# Patient Record
Sex: FEMALE | Race: WHITE | NOT HISPANIC OR LATINO | Employment: UNEMPLOYED | URBAN - NONMETROPOLITAN AREA
[De-identification: names, ages, dates, MRNs, and addresses within clinical notes are randomized per-mention and may not be internally consistent; named-entity substitution may affect disease eponyms.]

---

## 2017-07-25 ENCOUNTER — OFFICE VISIT (OUTPATIENT)
Dept: OBSTETRICS AND GYNECOLOGY | Facility: CLINIC | Age: 32
End: 2017-07-25

## 2017-07-25 VITALS
BODY MASS INDEX: 37.56 KG/M2 | HEIGHT: 64 IN | SYSTOLIC BLOOD PRESSURE: 126 MMHG | DIASTOLIC BLOOD PRESSURE: 74 MMHG | WEIGHT: 220 LBS

## 2017-07-25 DIAGNOSIS — N63.10 LUMP OF RIGHT BREAST: Primary | ICD-10-CM

## 2017-07-25 PROCEDURE — 99214 OFFICE O/P EST MOD 30 MIN: CPT | Performed by: PHYSICIAN ASSISTANT

## 2017-07-25 NOTE — PROGRESS NOTES
"Subjective   Chief Complaint   Patient presents with   • Breast Mass     pt recently found lump in right breast     /74  Ht 64\" (162.6 cm)  Wt 220 lb (99.8 kg)  LMP 2017  BMI 37.76 kg/m2   Kya Dempsey is a 32 y.o. year old  presenting to be seen for evaluation of right breast lump  Patient reports she noted possible right breast lump this past Friday while doing self breast exam  Patient has not had any breast pain-no nipple discharge or other symptoms  She has no family history of breast cancer  She is on no hormonal  contraception    Past Medical History:   Diagnosis Date   • Abnormal Pap smear of cervix    • Anxiety    • Depression    • GERD (gastroesophageal reflux disease)    • History of Papanicolaou smear of cervix    • IBS (irritable bowel syndrome)     pt states that its managed      No current outpatient prescriptions on file.   No Known Allergies   Past Surgical History:   Procedure Laterality Date   • LAPAROSCOPIC GASTRIC BANDING      placement , removal    • TUBAL ABDOMINAL LIGATION     • WISDOM TOOTH EXTRACTION        Social History     Social History   • Marital status: Single     Spouse name: N/A   • Number of children: N/A   • Years of education: N/A     Occupational History   • Not on file.     Social History Main Topics   • Smoking status: Never Smoker   • Smokeless tobacco: Never Used   • Alcohol use No   • Drug use: Yes     Special: Marijuana      Comment: occasionally   • Sexual activity: Defer     Other Topics Concern   • Not on file     Social History Narrative   • No narrative on file      Family History   Problem Relation Age of Onset   • Diabetes Father    • Hyperlipidemia Father    • Hypertension Father    • Other Father    • Diabetes Mother    • Hyperlipidemia Mother    • Hypertension Mother    • Skin cancer Paternal Grandfather    • Diabetes Paternal Grandfather    • Osteoporosis Paternal Grandmother    • Stroke Paternal Grandmother    • " Diabetes Maternal Grandmother    • Hyperlipidemia Maternal Grandmother    • Hypertension Maternal Grandmother        The following portions of the patient's history were reviewed and updated as appropriate:problem list, current medications, allergies, past family history, past medical history, past social history and past surgical history.    Review of Systems   Constitutional: Negative.    Gastrointestinal: Negative.    Genitourinary: Negative.    Hematological: Negative.         Objective     Physical Exam   Constitutional: She appears well-developed and well-nourished. She is cooperative.   Pulmonary/Chest: Right breast exhibits no inverted nipple, no mass, no nipple discharge, no skin change and no tenderness. Left breast exhibits no inverted nipple, no mass, no nipple discharge, no skin change and no tenderness.   Right breast with palpable flat oval 1 cm ropey  nodule at 3 oclock--this feels more consistent with fibrocystic tissue   Neurological: She is alert.   Skin: Skin is warm and dry.   Psychiatric: She has a normal mood and affect. Her behavior is normal.     Kya was seen today for breast mass.    Diagnoses and all orders for this visit:    Lump of right breast  -     US Breast Right Complete; Future             This note was electronically signed.    Mildred Barton PA-C   July 25, 2017

## 2017-07-25 NOTE — PATIENT INSTRUCTIONS
Patient reassurred that clinically very low level of suspicion regarding right breast nodule  Will obtain right breast ultrasound though for further evaluation

## 2017-07-27 ENCOUNTER — HOSPITAL ENCOUNTER (OUTPATIENT)
Dept: ULTRASOUND IMAGING | Facility: HOSPITAL | Age: 32
Discharge: HOME | End: 2017-07-27
Admitting: PHYSICIAN ASSISTANT

## 2017-07-27 DIAGNOSIS — N63.10 LUMP OF RIGHT BREAST: ICD-10-CM

## 2017-07-27 PROCEDURE — 76641 ULTRASOUND BREAST COMPLETE: CPT

## 2017-08-30 ENCOUNTER — OFFICE VISIT (OUTPATIENT)
Dept: OBSTETRICS AND GYNECOLOGY | Facility: CLINIC | Age: 32
End: 2017-08-30

## 2017-08-30 VITALS
WEIGHT: 223 LBS | DIASTOLIC BLOOD PRESSURE: 66 MMHG | HEIGHT: 64 IN | SYSTOLIC BLOOD PRESSURE: 124 MMHG | BODY MASS INDEX: 38.07 KG/M2

## 2017-08-30 DIAGNOSIS — Z01.419 ENCOUNTER FOR GYNECOLOGICAL EXAMINATION WITHOUT ABNORMAL FINDING: Primary | ICD-10-CM

## 2017-08-30 PROCEDURE — 99385 PREV VISIT NEW AGE 18-39: CPT | Performed by: NURSE PRACTITIONER

## 2017-08-30 RX ORDER — MULTIPLE VITAMINS W/ MINERALS TAB 9MG-400MCG
1 TAB ORAL DAILY
COMMUNITY
End: 2018-09-21

## 2017-08-30 RX ORDER — CHLORAL HYDRATE 500 MG
CAPSULE ORAL
COMMUNITY
End: 2019-09-11

## 2017-09-15 DIAGNOSIS — Z01.419 ENCOUNTER FOR GYNECOLOGICAL EXAMINATION WITHOUT ABNORMAL FINDING: ICD-10-CM

## 2018-01-09 ENCOUNTER — OFFICE VISIT (OUTPATIENT)
Dept: OBSTETRICS AND GYNECOLOGY | Facility: CLINIC | Age: 33
End: 2018-01-09

## 2018-01-09 VITALS
DIASTOLIC BLOOD PRESSURE: 72 MMHG | BODY MASS INDEX: 38.93 KG/M2 | HEIGHT: 64 IN | SYSTOLIC BLOOD PRESSURE: 116 MMHG | WEIGHT: 228 LBS

## 2018-01-09 DIAGNOSIS — R87.610 PAP SMEAR ABNORMALITY OF CERVIX WITH ASCUS FAVORING DYSPLASIA: Primary | ICD-10-CM

## 2018-01-09 PROCEDURE — 57455 BIOPSY OF CERVIX W/SCOPE: CPT | Performed by: OBSTETRICS & GYNECOLOGY

## 2018-01-09 PROCEDURE — 81025 URINE PREGNANCY TEST: CPT | Performed by: OBSTETRICS & GYNECOLOGY

## 2018-01-09 RX ORDER — IBUPROFEN 800 MG/1
TABLET ORAL
COMMUNITY
Start: 2018-01-04 | End: 2018-09-21

## 2018-01-09 NOTE — PATIENT INSTRUCTIONS
Colposcopy  Colposcopy is a procedure to examine your cervix and vagina, or the area around the outside of your vagina, for abnormalities or signs of disease. The procedure is done using a lighted microscope called a colposcope. Tissue samples may be collected during the colposcopy if your health care provider finds any unusual cells. A colposcopy may be done if a woman has:  · An abnormal Pap test. A Pap test is a medical test done to evaluate cells that are on the surface of the cervix.  · A Pap test result that is suggestive of human papillomavirus (HPV). This virus can cause genital warts and is linked to the development of cervical cancer.  · A sore on her cervix and the results of a Pap test were normal.  · Genital warts on the cervix or in or around the outside of the vagina.  · A mother who took the drug diethylstilbestrol (PILO) while pregnant.  · Painful intercourse.  · Vaginal bleeding, especially after sexual intercourse.  LET YOUR HEALTH CARE PROVIDER KNOW ABOUT:  · Any allergies you have.  · All medicines you are taking, including vitamins, herbs, eye drops, creams, and over-the-counter medicines.  · Previous problems you or members of your family have had with the use of anesthetics.  · Any blood disorders you have.  · Previous surgeries you have had.  · Medical conditions you have.  RISKS AND COMPLICATIONS  Generally, a colposcopy is a safe procedure. However, as with any procedure, complications can occur. Possible complications include:  · Bleeding.  · Infection.  · Missed lesions.  BEFORE THE PROCEDURE   · Tell your health care provider if you have your menstrual period. A colposcopy typically is not done during menstruation.  · For 24 hours before the colposcopy, do not:    Douche.    Use tampons.    Use medicines, creams, or suppositories in the vagina.    Have sexual intercourse.  PROCEDURE   During the procedure, you will be lying on your back with your feet in foot rests (stirrups). A warm  metal or plastic instrument (speculum) will be placed in your vagina to keep it open and to allow the health care provider to see the cervix. The colposcope will be placed outside the vagina. It will be used to magnify and examine the cervix, vagina, and the area around the outside of the vagina. A small amount of liquid solution will be placed on the area that is to be viewed. This solution will make it easier to see the abnormal cells. Your health care provider will use tools to suck out mucus and cells from the canal of the cervix. Then he or she will record the location of the abnormal areas.  If a biopsy is done during the procedure, a medicine will usually be given to numb the area (local anesthetic). You may feel mild pain or cramping while the biopsy is done. After the procedure, tissue samples collected during the biopsy will be sent to a lab for analysis.  AFTER THE PROCEDURE   You will be given instructions on when to follow up with your health care provider for your test results. It is important to keep your appointment.     This information is not intended to replace advice given to you by your health care provider. Make sure you discuss any questions you have with your health care provider.     Document Released: 03/09/2004 Document Revised: 08/20/2014 Document Reviewed: 07/17/2014  [employer] Interactive Patient Education ©2017 [employer]

## 2018-01-09 NOTE — PROGRESS NOTES
Colposcopy    Date of procedure:  1/9/2018    Risks and benefits discussed? yes  All questions answered? yes  Consents given by the patient  Written consent obtained? yes    Pre-op indication: ASCUS with POSITIVE high risk HPV  Procedure documentation:    The cervix was initially viewed colposcopically.  The cervix was next bathed in acetic acid.   The findings were as follows:      The transformation zone was able to be seen adequately.    Acetowhite noted at 1 o'clock and 12 o'clock    Ectocervical biopsies taken from 1 o'clock and 12 o'clock.  Monsels solution was applied to the biopsy sites..    An ECC was not performed.      Colposcopic Impression: 1. Adequate colposcopy  2. Colposcopic findings are consistent with PAP       Plan: Will base further treatment on pathology results      This note was electronically signed.    Parminder Edmonds MD.

## 2018-01-10 LAB
B-HCG UR QL: NEGATIVE
INTERNAL NEGATIVE CONTROL: NEGATIVE
INTERNAL POSITIVE CONTROL: POSITIVE
Lab: NORMAL

## 2018-01-12 DIAGNOSIS — R87.610 PAP SMEAR ABNORMALITY OF CERVIX WITH ASCUS FAVORING DYSPLASIA: ICD-10-CM

## 2018-09-21 ENCOUNTER — OFFICE VISIT (OUTPATIENT)
Dept: OBSTETRICS AND GYNECOLOGY | Facility: CLINIC | Age: 33
End: 2018-09-21

## 2018-09-21 VITALS — DIASTOLIC BLOOD PRESSURE: 70 MMHG | HEIGHT: 64 IN | SYSTOLIC BLOOD PRESSURE: 126 MMHG

## 2018-09-21 DIAGNOSIS — Z01.419 ENCOUNTER FOR GYNECOLOGICAL EXAMINATION WITHOUT ABNORMAL FINDING: Primary | ICD-10-CM

## 2018-09-21 PROCEDURE — 99395 PREV VISIT EST AGE 18-39: CPT | Performed by: NURSE PRACTITIONER

## 2018-09-21 RX ORDER — SODIUM PHOSPHATE,MONO-DIBASIC 19G-7G/118
ENEMA (ML) RECTAL
COMMUNITY
End: 2019-09-11

## 2018-09-27 DIAGNOSIS — Z01.419 ENCOUNTER FOR GYNECOLOGICAL EXAMINATION WITHOUT ABNORMAL FINDING: ICD-10-CM

## 2019-02-05 ENCOUNTER — OFFICE VISIT (OUTPATIENT)
Dept: OBSTETRICS AND GYNECOLOGY | Facility: CLINIC | Age: 34
End: 2019-02-05

## 2019-02-05 DIAGNOSIS — N93.0 PCB (POST COITAL BLEEDING): Primary | ICD-10-CM

## 2019-02-05 PROCEDURE — 99213 OFFICE O/P EST LOW 20 MIN: CPT | Performed by: MIDWIFE

## 2019-02-06 VITALS — HEIGHT: 64 IN | DIASTOLIC BLOOD PRESSURE: 80 MMHG | BODY MASS INDEX: 39.14 KG/M2 | SYSTOLIC BLOOD PRESSURE: 122 MMHG

## 2019-02-06 NOTE — PROGRESS NOTES
Subjective   Chief Complaint   Patient presents with   • Follow-up     TVS for AUB, declined weight      Kya Dempsey is a 33 y.o. year old  presenting to be seen for postcoital bleeding.  She was here 2018 for an annual exam and had a friable cervix at that visit.  Her pap smear showed inflammation then.  She has continued to have bleeding after every episode of sex. The amount varies from spotting to small amount of bright red blood.  She denies any pain. Her menstrual cycles are regular. She has had a tubal ligation.    History  Past Medical History:   Diagnosis Date   • Abnormal Pap smear of cervix    • Anxiety    • Depression    • GERD (gastroesophageal reflux disease)    • History of Papanicolaou smear of cervix    • IBS (irritable bowel syndrome)     pt states that its managed   • Papanicolaou smear of cervix within last year 2017    ASCUS HPV+   , Past Surgical History:   Procedure Laterality Date   • LAPAROSCOPIC GASTRIC BANDING      placement , removal    • TUBAL ABDOMINAL LIGATION     • WISDOM TOOTH EXTRACTION     , Family History   Problem Relation Age of Onset   • Diabetes Father    • Hyperlipidemia Father    • Hypertension Father    • Other Father    • Diabetes Mother    • Hyperlipidemia Mother    • Hypertension Mother    • Skin cancer Paternal Grandfather    • Diabetes Paternal Grandfather    • Osteoporosis Paternal Grandmother    • Stroke Paternal Grandmother    • Diabetes Maternal Grandmother    • Hyperlipidemia Maternal Grandmother    • Hypertension Maternal Grandmother    , Social History     Tobacco Use   • Smoking status: Never Smoker   • Smokeless tobacco: Never Used   Substance Use Topics   • Alcohol use: No   • Drug use: Yes     Types: Marijuana     Comment: occasionally   ,   (Not in a hospital admission) and Allergies:  Patient has no known allergies.    Social History    Tobacco Use      Smoking status: Never Smoker      Smokeless tobacco: Never  "Used      Review of Systems  Pertinent items are noted in HPI, all other systems reviewed and negative       Objective   /80   Ht 162.6 cm (64\")   LMP 01/27/2019 (Exact Date)   BMI 39.14 kg/m²     Physical Exam:  General Appearance: alert and cooperative  Abdomen: no masses and soft non-tender  Pelvic: External genitalia:  normal appearance of the external genitalia including Bartholin's and [place]'s glands.  Vaginal:  normal pink mucosa without prolapse or lesions.  Neurologic: Mental Status orientated to person, place, time and situation, Speech normal content and execusion  Psych: normal mood and affect, thought content organized and appropriate judgment    Lab Review   No data reviewed    Imaging   Pelvic ultrasound report   Uterus 7.5 x 4.18 x 3.32, anteverted  Endo thickness 7.65  Adnexa normal          Assessment /Plan    Kya was seen today for follow-up.    Diagnoses and all orders for this visit:    PCB (post coital bleeding)  -     Cancel: US Non-ob Transvaginal  -     Cancel: NuSwab VG+ - Swab, Vagina  -     NuSwab VG+ - Swab, Vagina      She will be notified of results and then will need to be placed on either Flagyl or other antibiotic for cervicitis  Follow up PRN           This note was electronically signed.  Cici Leary CNM  2/6/2019    "

## 2019-02-09 LAB
A VAGINAE DNA VAG QL NAA+PROBE: NORMAL SCORE
BVAB2 DNA VAG QL NAA+PROBE: NORMAL SCORE
C ALBICANS DNA VAG QL NAA+PROBE: NEGATIVE
C GLABRATA DNA VAG QL NAA+PROBE: NEGATIVE
C TRACH RRNA SPEC QL NAA+PROBE: NEGATIVE
MEGA1 DNA VAG QL NAA+PROBE: NORMAL SCORE
N GONORRHOEA RRNA SPEC QL NAA+PROBE: NEGATIVE
T VAGINALIS RRNA SPEC QL NAA+PROBE: NEGATIVE

## 2019-02-15 RX ORDER — AZITHROMYCIN 500 MG/1
TABLET, FILM COATED ORAL
Qty: 2 TABLET | Refills: 0 | Status: SHIPPED | OUTPATIENT
Start: 2019-02-15 | End: 2019-09-11

## 2019-09-11 ENCOUNTER — OFFICE VISIT (OUTPATIENT)
Dept: FAMILY MEDICINE CLINIC | Facility: CLINIC | Age: 34
End: 2019-09-11

## 2019-09-11 VITALS
HEIGHT: 64 IN | DIASTOLIC BLOOD PRESSURE: 88 MMHG | SYSTOLIC BLOOD PRESSURE: 124 MMHG | OXYGEN SATURATION: 98 % | BODY MASS INDEX: 43.84 KG/M2 | HEART RATE: 98 BPM | TEMPERATURE: 98 F | WEIGHT: 256.8 LBS

## 2019-09-11 DIAGNOSIS — M54.32 SCIATICA OF LEFT SIDE: ICD-10-CM

## 2019-09-11 DIAGNOSIS — Z13.29 SCREENING FOR THYROID DISORDER: ICD-10-CM

## 2019-09-11 DIAGNOSIS — Z13.0 SCREENING FOR DEFICIENCY ANEMIA: ICD-10-CM

## 2019-09-11 DIAGNOSIS — Z13.220 SCREENING FOR LIPID DISORDERS: ICD-10-CM

## 2019-09-11 DIAGNOSIS — F41.1 GAD (GENERALIZED ANXIETY DISORDER): Primary | ICD-10-CM

## 2019-09-11 DIAGNOSIS — F90.0 ATTENTION DEFICIT HYPERACTIVITY DISORDER (ADHD), PREDOMINANTLY INATTENTIVE TYPE: ICD-10-CM

## 2019-09-11 DIAGNOSIS — Z23 NEED FOR TDAP VACCINATION: ICD-10-CM

## 2019-09-11 DIAGNOSIS — F43.10 PTSD (POST-TRAUMATIC STRESS DISORDER): ICD-10-CM

## 2019-09-11 DIAGNOSIS — Z13.1 SCREENING FOR DIABETES MELLITUS: ICD-10-CM

## 2019-09-11 DIAGNOSIS — E66.01 MORBIDLY OBESE (HCC): ICD-10-CM

## 2019-09-11 DIAGNOSIS — Z76.89 REFERRAL OF PATIENT: ICD-10-CM

## 2019-09-11 PROBLEM — Z86.59 HISTORY OF EATING DISORDER: Status: RESOLVED | Noted: 2019-09-11 | Resolved: 2019-09-11

## 2019-09-11 PROBLEM — S05.01XA ABRASION OF RIGHT CORNEA: Status: ACTIVE | Noted: 2019-09-11

## 2019-09-11 PROBLEM — Z86.59 HISTORY OF EATING DISORDER: Status: ACTIVE | Noted: 2019-09-11

## 2019-09-11 PROBLEM — S05.01XA ABRASION OF RIGHT CORNEA: Status: RESOLVED | Noted: 2019-09-11 | Resolved: 2019-09-11

## 2019-09-11 PROCEDURE — 99213 OFFICE O/P EST LOW 20 MIN: CPT | Performed by: NURSE PRACTITIONER

## 2019-09-11 PROCEDURE — 90471 IMMUNIZATION ADMIN: CPT | Performed by: NURSE PRACTITIONER

## 2019-09-11 PROCEDURE — 90715 TDAP VACCINE 7 YRS/> IM: CPT | Performed by: NURSE PRACTITIONER

## 2019-09-11 NOTE — PROGRESS NOTES
Kya Dempsey presents today to establish care.    Chief Complaint   Patient presents with   • REFERRALS     new patient, BEHAVIORAL HEALTH AND DERMATOLOGIST         LEONEL Boland presents today to establish care.  Requesting several referrals.  History of eating disorder, does not want to know her weight.    Dermatology-has not seen dermatologist in 5 years.  Requesting dermatology referral for full body skin check.  No concerning spots.    Gynecology- previously established with a midwife in [place] but is wanting to move care to [place].  History of abnormal Pap.  Pap within the last year.  History of tubal ligation.    Psychiatry- already established with psychology for counseling for PTSD and anxiety.  Requesting psychiatry for further evaluation of ADHD.    ADHD symptoms  Acute.  Worsening.  States that some people of told her that she may have ADHD.  She has trouble staying on task, procrastinating, being inattentive, and cannot get motivated to complete simple tasks such as doing laundry.  She does have a history of anxiety and depression.    Has never tried any treatment for anxiety or depression.  Likes to avoid medication when possible.  Performing cognitive behavioral therapy for anxiety.    IBS C/D  Chronic.  Stable.  She is gluten-free and avoids foods that upset her stomach.  This is diet controlled, no medications.    Sciatic nerve flare  Acute.  Worsening.  Symptoms started 2 days ago.  Pain starts in her left buttocks and radiates down her left side of leg.  Has had flares in the past that she typically stretches, applies ice and heat, and drinks more water and it goes away.  She is wanting to know if physical therapy would help.  She does not want medication if possible.  Currently she has tried ice, heat, CBD oil.  This has seemed to lessen symptoms.  Denies pain but states it is more of a discomfort.    Health Maintenance:  -Sees dentist and opthalmology regularly.  -Sexually  active  -Birth control: tubal  -Use of seatbelt 100% of the time  -Smoke/CO detector working in home  -Occ Marijuana smoker  -Vaccines UTD      PHQ-2/PHQ-9 Depression Screening 9/11/2019   Little interest or pleasure in doing things 0   Feeling down, depressed, or hopeless 1   Total Score 1       Review of Systems   Constitutional: Negative for activity change, fatigue, unexpected weight gain and unexpected weight loss.   HENT: Negative for congestion.    Eyes: Negative for blurred vision and visual disturbance.   Respiratory: Negative for chest tightness, shortness of breath and wheezing.    Cardiovascular: Negative for chest pain, palpitations and leg swelling.   Gastrointestinal: Negative for constipation, diarrhea, nausea, vomiting and GERD.   Musculoskeletal: Negative for arthralgias.   Neurological: Negative for dizziness, light-headedness, headache and confusion.   Psychiatric/Behavioral: Positive for decreased concentration. Negative for sleep disturbance, depressed mood and stress. The patient is nervous/anxious.    All other systems reviewed and are negative.       Past Medical History:   Diagnosis Date   • Abnormal Pap smear of cervix    • Anxiety    • Depression    • GERD (gastroesophageal reflux disease)    • History of Papanicolaou smear of cervix 2015   • IBS (irritable bowel syndrome)     pt states that its managed   • Papanicolaou smear of cervix within last year 08/30/2017    ASCUS HPV+        Past Surgical History:   Procedure Laterality Date   • LAPAROSCOPIC GASTRIC BANDING      placement 2012, removal 2015   • TUBAL ABDOMINAL LIGATION     • WISDOM TOOTH EXTRACTION          Family History   Problem Relation Age of Onset   • Diabetes Father    • Hyperlipidemia Father    • Hypertension Father    • Other Father    • Diabetes Mother    • Hyperlipidemia Mother    • Hypertension Mother    • Skin cancer Paternal Grandfather    • Diabetes Paternal Grandfather    • Osteoporosis Paternal Grandmother    •  "Stroke Paternal Grandmother    • Diabetes Maternal Grandmother    • Hyperlipidemia Maternal Grandmother    • Hypertension Maternal Grandmother         Social History     Socioeconomic History   • Marital status: Single     Spouse name: Not on file   • Number of children: Not on file   • Years of education: Not on file   • Highest education level: Not on file   Occupational History     Employer: [employer]     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     • [DISCONTINUED] glucosamine sulfate 500 MG capsule capsule Take  by mouth 3 (Three) Times a Day With Meals.     • [DISCONTINUED] Omega-3 Fatty Acids (FISH OIL) 1000 MG capsule capsule Take  by mouth Daily With Breakfast.     • [DISCONTINUED] azithromycin (ZITHROMAX) 500 MG tablet Take 2 tablets daily times one day 2 tablet 0     No current facility-administered medications on file prior to visit.        No Known Allergies     Visit Vitals  /88 (BP Location: Right arm, Patient Position: Sitting, Cuff Size: Large Adult)   Pulse 98   Temp 98 °F (36.7 °C) (Temporal)   Ht 162.6 cm (64\")   Wt 116 kg (256 lb 12.8 oz)   SpO2 98%   Breastfeeding? No   BMI 44.08 kg/m²        Physical Exam   Constitutional: She is oriented to person, place, and time. Vital signs are normal. She appears well-developed and well-nourished. No distress.   HENT:   Head: Normocephalic.   Right Ear: Hearing, tympanic membrane, external ear and ear canal normal. "   Left Ear: Hearing, tympanic membrane, external ear and ear canal normal.   Nose: Nose normal.   Mouth/Throat: Uvula is midline and oropharynx is clear and moist.   Eyes: Conjunctivae and lids are normal. Pupils are equal, round, and reactive to light. Right eye exhibits no discharge. Left eye exhibits no discharge. No scleral icterus.   Neck: Normal range of motion. No JVD present. Carotid bruit is not present. No thyromegaly present.   Cardiovascular: Normal rate, regular rhythm, normal heart sounds and intact distal pulses. Exam reveals no gallop and no friction rub.   No murmur heard.  Pulmonary/Chest: Effort normal and breath sounds normal. No respiratory distress.   Abdominal: Soft. Normal appearance and bowel sounds are normal. She exhibits no distension and no mass. There is no hepatosplenomegaly. There is no tenderness. There is no rebound, no guarding, no CVA tenderness, no tenderness at McBurney's point and negative Hale's sign. No hernia.   Musculoskeletal: Normal range of motion. She exhibits no edema.        Left hip: She exhibits normal range of motion, normal strength, no tenderness and no swelling.   Negative straight leg raise   Lymphadenopathy:        Head (right side): No submental, no submandibular, no tonsillar, no preauricular, no posterior auricular and no occipital adenopathy present.        Head (left side): No submental, no submandibular, no tonsillar, no preauricular, no posterior auricular and no occipital adenopathy present.     She has no cervical adenopathy.   Neurological: She is alert and oriented to person, place, and time. She has normal strength. She displays normal reflexes. No sensory deficit. Coordination normal. GCS eye subscore is 4. GCS verbal subscore is 5. GCS motor subscore is 6.   Skin: Skin is warm, dry and intact. Capillary refill takes less than 2 seconds. No rash noted. No erythema.   Psychiatric: She has a normal mood and affect. Her speech is normal and  behavior is normal. Judgment and thought content normal.   Nursing note and vitals reviewed.       Results for orders placed or performed in visit on 02/05/19   NuSwab VG+ - Swab, Vagina   Result Value Ref Range    Atopobium Vaginae Low - 0 Score    BVAB 2 Low - 0 Score    Megasphaera 1 Low - 0 Score    Candida Albicans, DRAGAN Negative Negative    Candida Glabrata, DRAGAN Negative Negative    Trichomonas vaginosis Negative Negative    Chlamydia trachomatis, DRAGAN Negative Negative    Neisseria gonorrhoeae, DRAGAN Negative Negative        Assessment/Plan  Kya was seen today for referrals.    Diagnoses and all orders for this visit:    ENA (generalized anxiety disorder)  PTSD (post-traumatic stress disorder)  Attention deficit hyperactivity disorder (ADHD), predominantly inattentive type  -     Ambulatory Referral to Psychiatry    Referral of patient  -     Ambulatory Referral to Gynecology  -     Ambulatory Referral to Dermatology    Sciatica of left side  -     Ambulatory Referral to Physical Therapy Evaluate and treat    Screening for deficiency anemia  -     CBC & Differential; Future  Screening for diabetes mellitus  -     Comprehensive Metabolic Panel; Future  Screening for lipid disorders  -     Lipid Panel; Future  Screening for thyroid disorder  -     TSH Rfx On Abnormal To Free T4; Future    Need for Tdap vaccination  -     Tdap Vaccine Greater Than or Equal To 6yo IM    Morbid obesity  -Due to history of eating disorder, we did not spend much time talking about weight or weight management.  She understands that she needs to eat a healthier diet and exercise.  I will address further at her annual visit.    I will notify of lab results.    Return in about 3 months (around 12/11/2019) for Annual.

## 2019-09-18 ENCOUNTER — TELEPHONE (OUTPATIENT)
Dept: FAMILY MEDICINE CLINIC | Facility: CLINIC | Age: 34
End: 2019-09-18

## 2019-10-17 ENCOUNTER — OFFICE VISIT (OUTPATIENT)
Dept: OBSTETRICS AND GYNECOLOGY | Facility: CLINIC | Age: 34
End: 2019-10-17

## 2019-10-17 VITALS — WEIGHT: 255 LBS | DIASTOLIC BLOOD PRESSURE: 70 MMHG | BODY MASS INDEX: 43.77 KG/M2 | SYSTOLIC BLOOD PRESSURE: 110 MMHG

## 2019-10-17 DIAGNOSIS — Z01.419 WELL WOMAN EXAM WITH ROUTINE GYNECOLOGICAL EXAM: Primary | ICD-10-CM

## 2019-10-17 DIAGNOSIS — Z11.3 SCREEN FOR STD (SEXUALLY TRANSMITTED DISEASE): ICD-10-CM

## 2019-10-17 DIAGNOSIS — N87.0 MILD DYSPLASIA OF CERVIX (CIN I): ICD-10-CM

## 2019-10-17 DIAGNOSIS — N39.3 STRESS INCONTINENCE IN FEMALE: ICD-10-CM

## 2019-10-17 PROCEDURE — 99385 PREV VISIT NEW AGE 18-39: CPT | Performed by: OBSTETRICS & GYNECOLOGY

## 2019-10-17 RX ORDER — TACROLIMUS 1 MG/G
OINTMENT TOPICAL
COMMUNITY
Start: 2019-10-02 | End: 2022-12-30

## 2019-10-17 RX ORDER — KETOCONAZOLE 20 MG/ML
SHAMPOO TOPICAL
COMMUNITY
Start: 2019-10-01 | End: 2022-12-30 | Stop reason: SDUPTHER

## 2019-10-17 RX ORDER — TRIAMCINOLONE ACETONIDE 1 MG/G
CREAM TOPICAL
COMMUNITY
Start: 2019-10-01 | End: 2020-12-10

## 2019-10-17 NOTE — PROGRESS NOTES
Subjective   Chief Complaint   Patient presents with   • Exposure to STD     would like to be tested for STDs and establish care with a new provider   • Urine Leakage     mostly happens when she coughs and sneezes     Kya Osiris Dempsey is a 34 y.o. year old  presenting to be seen for her annual exam.  Patient wants STD screening.  She has had stress incontinence for several years and thinks this is getting worse.  Conservative management was discussed.  The patient is doing Kegel exercises.  Patient had a history of abnormal Pap smears.  She underwent a biopsy in 2018 and this was read as mild dysplasia.  Patient had a Pap smear last September which was read as normal.  We will repeat Pap smear today    SEXUAL Hx:  She is currently sexually active.  In the past year there has been new sexual partners.    Condoms are not typically used.  She would not like to be screened for STD's at today's exam.  Current birth control method: tubal ligation.  She is happy with her current method of contraception and does not want to discuss alternative methods of contraception.  MENSTRUAL Hx:  Patient's last menstrual period was 2019 (exact date).  In the past 6 months her cycles have been regular, predictable and occur monthly.  Her menstrual flow is normal.   Each month on average there are roughly 1 day(s) of very heavy flow.    Intermenstrual bleeding is absent.    Post-coital bleeding is present.  Dysmenorrhea: is not affecting her activities of daily living  PMS: affecting her activities of daily living  Her cycles are not a source of concern for her that she wishes to discuss today.  HEALTH Hx:  She exercises regularly:yes.  She wears her seat belt:yes.  She has concerns about domestic violence: no.  OTHER COMPLAINTS:  Nothing else    The following portions of the patient's history were reviewed and updated as appropriate:problem list, current medications, allergies, past family history, past medical  history, past social history and past surgical history.    Social History    Tobacco Use      Smoking status: Never Smoker      Smokeless tobacco: Never Used    Review of Systems  Review of Systems - History obtained from the patient  General ROS: negative  Psychological ROS: positive for - anxiety and depression  ENT ROS: negative  Allergy and Immunology ROS: positive for - seasonal allergies  Hematological and Lymphatic ROS: positive for - bruising  Endocrine ROS: negative  Breast ROS: negative for breast lumps  Respiratory ROS: no cough, shortness of breath, or wheezing  Cardiovascular ROS: no chest pain or dyspnea on exertion  Gastrointestinal ROS: positive for - diarrhea and nausea/vomiting  Genito-Urinary ROS: positive for - incontinence  Musculoskeletal ROS: positive for - pain in back - left  Neurological ROS: no TIA or stroke symptoms  Dermatological ROS: positive for rash        Objective   /70 (BP Location: Left arm, Patient Position: Sitting, Cuff Size: Adult)   Wt 116 kg (255 lb)   LMP 09/20/2019 (Exact Date)   Breastfeeding? No   BMI 43.77 kg/m²     General:  well developed; well nourished  no acute distress   Skin:  No suspicious lesions seen   Thyroid: normal to inspection and palpation   Breasts:  Examined in supine position  Symmetric without masses or skin dimpling  Nipples normal without inversion, lesions or discharge  There are no palpable axillary nodes   Abdomen: soft, non-tender; no masses  no umbilical or inguinal hernias are present  no hepato-splenomegaly   Heart: regular rate and rhythm, S1, S2 normal, no murmur, click, rub or gallop   Lungs: clear to auscultation   Pelvis: Clinical staff was present for exam  External genitalia:  normal appearance of the external genitalia including Bartholin's and Germania's glands.  :  urethral meatus normal;  Vaginal:  normal pink mucosa without prolapse or lesions.  Cervix:  normal appearance. Pap smear and 1 swab for STD screen was  done  Uterus:  normal size, shape and consistency. anteverted;  Adnexa:  normal bimanual exam of the adnexa.  Rectal:  digital rectal exam not performed; anus visually normal appearing.          Assessment/Plan   Kya was seen today for exposure to std and urine leakage.    Diagnoses and all orders for this visit:    Well woman exam with routine gynecological exam    Mild dysplasia of cervix (KARI I)    Stress incontinence in female    Screen for STD (sexually transmitted disease)  -     Cancel: Chlamydia trachomatis, Neisseria gonorrhoeae, Trichomonas vaginalis, PCR - Swab, Vagina  -     Chlamydia trachomatis, Neisseria gonorrhoeae, Trichomonas vaginalis, PCR - Swab, Vagina       Return in 1 year or sooner if needed    This note was electronically signed.    Hardy Toney MD   October 17, 2019

## 2019-10-22 ENCOUNTER — TELEPHONE (OUTPATIENT)
Dept: OBSTETRICS AND GYNECOLOGY | Facility: CLINIC | Age: 34
End: 2019-10-22

## 2019-11-07 ENCOUNTER — TELEPHONE (OUTPATIENT)
Dept: OBSTETRICS AND GYNECOLOGY | Facility: CLINIC | Age: 34
End: 2019-11-07

## 2019-12-02 ENCOUNTER — LAB (OUTPATIENT)
Dept: LAB | Facility: HOSPITAL | Age: 34
End: 2019-12-02

## 2019-12-02 DIAGNOSIS — Z13.29 SCREENING FOR THYROID DISORDER: ICD-10-CM

## 2019-12-02 DIAGNOSIS — Z13.220 SCREENING FOR LIPID DISORDERS: ICD-10-CM

## 2019-12-02 DIAGNOSIS — Z13.1 SCREENING FOR DIABETES MELLITUS: ICD-10-CM

## 2019-12-02 DIAGNOSIS — Z13.0 SCREENING FOR DEFICIENCY ANEMIA: ICD-10-CM

## 2019-12-02 LAB
ALBUMIN SERPL-MCNC: 4.4 G/DL (ref 3.5–5.2)
ALBUMIN/GLOB SERPL: 1.3 G/DL
ALP SERPL-CCNC: 57 U/L (ref 39–117)
ALT SERPL W P-5'-P-CCNC: 21 U/L (ref 1–33)
ANION GAP SERPL CALCULATED.3IONS-SCNC: 10.6 MMOL/L (ref 5–15)
AST SERPL-CCNC: 24 U/L (ref 1–32)
BASOPHILS # BLD AUTO: 0.03 10*3/MM3 (ref 0–0.2)
BASOPHILS NFR BLD AUTO: 0.3 % (ref 0–1.5)
BILIRUB SERPL-MCNC: 0.2 MG/DL (ref 0.2–1.2)
BUN BLD-MCNC: 10 MG/DL (ref 6–20)
BUN/CREAT SERPL: 16.4 (ref 7–25)
CALCIUM SPEC-SCNC: 9.3 MG/DL (ref 8.6–10.5)
CHLORIDE SERPL-SCNC: 101 MMOL/L (ref 98–107)
CHOLEST SERPL-MCNC: 187 MG/DL (ref 0–200)
CO2 SERPL-SCNC: 26.4 MMOL/L (ref 22–29)
CREAT BLD-MCNC: 0.61 MG/DL (ref 0.57–1)
DEPRECATED RDW RBC AUTO: 41.5 FL (ref 37–54)
EOSINOPHIL # BLD AUTO: 0.15 10*3/MM3 (ref 0–0.4)
EOSINOPHIL NFR BLD AUTO: 1.7 % (ref 0.3–6.2)
ERYTHROCYTE [DISTWIDTH] IN BLOOD BY AUTOMATED COUNT: 12 % (ref 12.3–15.4)
GFR SERPL CREATININE-BSD FRML MDRD: 114 ML/MIN/1.73
GLOBULIN UR ELPH-MCNC: 3.3 GM/DL
GLUCOSE BLD-MCNC: 96 MG/DL (ref 65–99)
HCT VFR BLD AUTO: 40.4 % (ref 34–46.6)
HDLC SERPL-MCNC: 64 MG/DL (ref 40–60)
HGB BLD-MCNC: 13.9 G/DL (ref 12–15.9)
IMM GRANULOCYTES # BLD AUTO: 0.05 10*3/MM3 (ref 0–0.05)
IMM GRANULOCYTES NFR BLD AUTO: 0.6 % (ref 0–0.5)
LDLC SERPL CALC-MCNC: 113 MG/DL (ref 0–100)
LDLC/HDLC SERPL: 1.76 {RATIO}
LYMPHOCYTES # BLD AUTO: 1.86 10*3/MM3 (ref 0.7–3.1)
LYMPHOCYTES NFR BLD AUTO: 21.2 % (ref 19.6–45.3)
MCH RBC QN AUTO: 32.8 PG (ref 26.6–33)
MCHC RBC AUTO-ENTMCNC: 34.4 G/DL (ref 31.5–35.7)
MCV RBC AUTO: 95.3 FL (ref 79–97)
MONOCYTES # BLD AUTO: 0.82 10*3/MM3 (ref 0.1–0.9)
MONOCYTES NFR BLD AUTO: 9.3 % (ref 5–12)
NEUTROPHILS # BLD AUTO: 5.88 10*3/MM3 (ref 1.7–7)
NEUTROPHILS NFR BLD AUTO: 66.9 % (ref 42.7–76)
NRBC BLD AUTO-RTO: 0 /100 WBC (ref 0–0.2)
PLATELET # BLD AUTO: 215 10*3/MM3 (ref 140–450)
PMV BLD AUTO: 11.3 FL (ref 6–12)
POTASSIUM BLD-SCNC: 4.3 MMOL/L (ref 3.5–5.2)
PROT SERPL-MCNC: 7.7 G/DL (ref 6–8.5)
RBC # BLD AUTO: 4.24 10*6/MM3 (ref 3.77–5.28)
SODIUM BLD-SCNC: 138 MMOL/L (ref 136–145)
T4 FREE SERPL-MCNC: 1.11 NG/DL (ref 0.93–1.7)
TRIGL SERPL-MCNC: 51 MG/DL (ref 0–150)
TSH SERPL DL<=0.05 MIU/L-ACNC: 4.42 UIU/ML (ref 0.27–4.2)
VLDLC SERPL-MCNC: 10.2 MG/DL (ref 5–40)
WBC NRBC COR # BLD: 8.79 10*3/MM3 (ref 3.4–10.8)

## 2019-12-02 PROCEDURE — 84439 ASSAY OF FREE THYROXINE: CPT

## 2019-12-02 PROCEDURE — 80050 GENERAL HEALTH PANEL: CPT

## 2019-12-02 PROCEDURE — 80061 LIPID PANEL: CPT

## 2019-12-09 ENCOUNTER — OFFICE VISIT (OUTPATIENT)
Dept: OBSTETRICS AND GYNECOLOGY | Facility: CLINIC | Age: 34
End: 2019-12-09

## 2019-12-09 VITALS
BODY MASS INDEX: 43.77 KG/M2 | HEIGHT: 64 IN | SYSTOLIC BLOOD PRESSURE: 110 MMHG | RESPIRATION RATE: 14 BRPM | DIASTOLIC BLOOD PRESSURE: 66 MMHG

## 2019-12-09 DIAGNOSIS — R87.810 CERVICAL HIGH RISK HUMAN PAPILLOMAVIRUS (HPV) DNA TEST POSITIVE: Primary | ICD-10-CM

## 2019-12-09 PROCEDURE — 57455 BIOPSY OF CERVIX W/SCOPE: CPT | Performed by: OBSTETRICS & GYNECOLOGY

## 2019-12-09 RX ORDER — SILICONE ADHESIVE 1.5" X 3"
1 SHEET (EA) TOPICAL AS NEEDED
COMMUNITY

## 2019-12-09 NOTE — PROGRESS NOTES
Colposcopy    Date of procedure:  12/9/2019   Risks and benefits discussed? yes   All questions answered? yes   Consents given by: patient   Written consent obtained? yes   Pre-op indication: Normal PAP  Positive high risk non-16 non-18 HPV          Procedure documentation:  The cervix was initially viewed colposcopically through a green filter.  The cervix was next bathed in acetic acid.   The findings were as follows:    Transformation zone seen? Yes   Findings: 1. Acetowhite noted at 6 o'clock and 12 o'clock   Ectocervical biopsies: taken from 6 o'clock and 12 o'clock.  Monsels solution was applied to the biopsy sites.   Endocervical curettage: not performed               Colposcopic Impression: 1. Mild dysplasia  2. Adequate colposcopy  3. Colposcopic findings are consistent with cytology       Plan: Will base further treatment on pathology results  Post biopsy instructions given to patient.  Specimens labelled and sent to pathology.  follow-up in 2 week(s) for biopsy results, if mild dysplasia repeat the Pap smear within 1 year         This note was electronically signed.    Hardy Toeny MD    December 9, 2019

## 2019-12-11 ENCOUNTER — OFFICE VISIT (OUTPATIENT)
Dept: FAMILY MEDICINE CLINIC | Facility: CLINIC | Age: 34
End: 2019-12-11

## 2019-12-11 ENCOUNTER — TELEPHONE (OUTPATIENT)
Dept: OBSTETRICS AND GYNECOLOGY | Facility: CLINIC | Age: 34
End: 2019-12-11

## 2019-12-11 VITALS
DIASTOLIC BLOOD PRESSURE: 76 MMHG | SYSTOLIC BLOOD PRESSURE: 120 MMHG | HEART RATE: 96 BPM | OXYGEN SATURATION: 98 % | HEIGHT: 64 IN | BODY MASS INDEX: 43.77 KG/M2

## 2019-12-11 DIAGNOSIS — R79.89 ELEVATED TSH: ICD-10-CM

## 2019-12-11 DIAGNOSIS — E66.01 MORBIDLY OBESE (HCC): ICD-10-CM

## 2019-12-11 DIAGNOSIS — F41.1 GAD (GENERALIZED ANXIETY DISORDER): ICD-10-CM

## 2019-12-11 DIAGNOSIS — Z00.00 ANNUAL PHYSICAL EXAM: Primary | ICD-10-CM

## 2019-12-11 PROCEDURE — 99395 PREV VISIT EST AGE 18-39: CPT | Performed by: NURSE PRACTITIONER

## 2019-12-11 RX ORDER — HYDROXYZINE HYDROCHLORIDE 25 MG/1
25 TABLET, FILM COATED ORAL EVERY 6 HOURS PRN
Qty: 100 TABLET | Refills: 5 | Status: SHIPPED | OUTPATIENT
Start: 2019-12-11 | End: 2020-12-10

## 2019-12-11 NOTE — PROGRESS NOTES
Chief Complaint   Patient presents with   • Annual Exam       Kya Dempsey is a 34 y.o. female and is here for a comprehensive physical exam. The patient reports no concerns.     History:  LMP: Patient's last menstrual period was 2019 (exact date).  Last pap date: 2018  Abnormal pap? yes she has had a colposcopy recently.  Under the care of gynecology.  : 0  Para: 0    Do you take any herbs or supplements that were not prescribed by a doctor? vitamin D  Are you taking calcium supplements? no  Are you taking aspirin daily? no      Health Habits:  Dental Exam. up to date  Eye Exam. up to date  Exercise: 4 times/week.  Current exercise activities include: yoga and and PT and walking    Health Maintenance   Topic Date Due   • ANNUAL PHYSICAL  1990   • PAP SMEAR  2021   • TDAP/TD VACCINES (2 - Td) 2029   • INFLUENZA VACCINE  Discontinued       PMH, PSH, SocHx, FamHx, Allergies, and Medications: Reviewed and updated in the Visit Navigator.     No Known Allergies  Past Medical History:   Diagnosis Date   • Abnormal Pap smear of cervix    • Anxiety    • Depression    • GERD (gastroesophageal reflux disease)    • History of Papanicolaou smear of cervix    • IBS (irritable bowel syndrome)     pt states that its managed   • Mild dysplasia of cervix 2019   • Papanicolaou smear of cervix within last year 2017    ASCUS HPV+     Past Surgical History:   Procedure Laterality Date   • COLPOSCOPY     • LAPAROSCOPIC GASTRIC BANDING      placement , removal    • TUBAL ABDOMINAL LIGATION     • WISDOM TOOTH EXTRACTION       Social History     Socioeconomic History   • Marital status: Single     Spouse name: Not on file   • Number of children: Not on file   • Years of education: Not on file   • Highest education level: Not on file   Occupational History     Employer: [employer]     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   Tobacco Use   • Smoking status: Never Smoker   •  Smokeless tobacco: Never Used   Substance and Sexual Activity   • Alcohol use: Yes     Frequency: 2-4 times a month     Comment: socially   • Drug use: Yes     Types: Marijuana     Comment: occasionally   • Sexual activity: Yes     Birth control/protection: Surgical     Comment: tubes tied   Social History Narrative    Degree in biology     Family History   Problem Relation Age of Onset   • Diabetes Father    • Hyperlipidemia Father    • Hypertension Father    • Other Father    • Diabetes Mother    • Hyperlipidemia Mother    • Hypertension Mother    • Skin cancer Paternal Grandfather    • Diabetes Paternal Grandfather    • Osteoporosis Paternal Grandmother    • Stroke Paternal Grandmother    • Diabetes Maternal Grandmother    • Hyperlipidemia Maternal Grandmother    • Hypertension Maternal Grandmother        Review of Systems  Review of Systems   Constitutional: Positive for fatigue. Negative for activity change and chills.   HENT: Negative for congestion, postnasal drip, rhinorrhea and sinus pressure.    Eyes: Negative for discharge and visual disturbance.   Respiratory: Negative for chest tightness, shortness of breath and wheezing.    Cardiovascular: Negative for chest pain, palpitations and leg swelling.   Gastrointestinal: Negative for abdominal pain, blood in stool, constipation, diarrhea, nausea and vomiting.   Endocrine: Negative for cold intolerance and heat intolerance.   Genitourinary: Negative for decreased urine volume, dyspareunia, flank pain, frequency, menstrual problem, urgency, vaginal bleeding, vaginal discharge and vaginal pain.   Musculoskeletal: Negative for arthralgias and neck stiffness.   Skin: Negative for color change.   Neurological: Positive for headaches (occassional). Negative for dizziness, weakness and light-headedness.   Psychiatric/Behavioral: Negative for agitation. The patient is not nervous/anxious.        Vitals:    12/11/19 1625   BP: 120/76   Pulse: 96   SpO2: 98%  "      Objective   /76 (BP Location: Left arm, Patient Position: Sitting, Cuff Size: Large Adult)   Pulse 96   Ht 162.6 cm (64\")   LMP 11/17/2019 (Exact Date)   SpO2 98%   BMI 43.77 kg/m²     Physical Exam   Constitutional: She is oriented to person, place, and time. Vital signs are normal. She appears well-developed and well-nourished.   HENT:   Head: Normocephalic.   Right Ear: Hearing, tympanic membrane, external ear and ear canal normal.   Left Ear: Hearing, tympanic membrane, external ear and ear canal normal.   Nose: Nose normal.   Mouth/Throat: Uvula is midline, oropharynx is clear and moist and mucous membranes are normal.   Eyes: Pupils are equal, round, and reactive to light. Conjunctivae and lids are normal.   Neck: Normal range of motion. No JVD present. Carotid bruit is not present. No thyromegaly present.   Cardiovascular: Normal rate, regular rhythm, normal heart sounds and intact distal pulses.   Pulmonary/Chest: Effort normal and breath sounds normal. She has no wheezes.   Abdominal: Soft. Normal appearance, normal aorta and bowel sounds are normal. There is no hepatosplenomegaly. There is no tenderness. There is no CVA tenderness, no tenderness at McBurney's point and negative Hale's sign.   Musculoskeletal: Normal range of motion.   Lymphadenopathy:        Head (right side): No submental, no submandibular, no tonsillar, no preauricular, no posterior auricular and no occipital adenopathy present.        Head (left side): No submental, no submandibular, no tonsillar, no preauricular, no posterior auricular and no occipital adenopathy present.     She has no cervical adenopathy.   Neurological: She is alert and oriented to person, place, and time. GCS eye subscore is 4. GCS verbal subscore is 5. GCS motor subscore is 6.   Skin: Skin is warm, dry and intact.   Psychiatric: She has a normal mood and affect. Her speech is normal and behavior is normal. Judgment and thought content normal. "   Nursing note and vitals reviewed.       Assessment/Plan   1. Healthy female exam.    2. Patient Counseling: Including but not Limited to the following, when appropriate:  --Nutrition: Stressed importance of moderation in sodium/caffeine intake, saturated fat and cholesterol, caloric balance, sufficient intake of fresh fruits, vegetables, fiber, calcium, iron, and 1 mg of folate supplement per day (for females capable of pregnancy).  --Discussed the issue of estrogen replacement, calcium supplement, and the daily use of baby aspirin.  --Exercise: Stressed the importance of regular exercise.   --Substance Abuse: Discussed cessation/primary prevention of tobacco, alcohol, or other drug use; driving or other dangerous activities under the influence; availability of treatment for abuse, as indicated based on social history.    --Sexuality: Discussed sexually transmitted diseases, partner selection, use of condoms, avoidance of unintended pregnancy  and contraceptive alternatives.   --Injury prevention: Discussed safety belts, safety helmets, smoke detector, smoking near bedding or upholstery.   --Dental health: Discussed importance of regular tooth brushing, flossing, and dental visits.  --Immunizations reviewed.  --Discussed benefits of colon cancer screening.      3. Discussed the patient's BMI with her.  The BMI is above average; BMI management plan is completed  4. Return in about 3 months (around 3/11/2020) for Follow-up for anxiety.  5. Age-appropriate Screening Scheduled  6.   Patient Instructions   Hypothyroidism    Hypothyroidism is when the thyroid gland does not make enough of certain hormones (it is underactive). The thyroid gland is a small gland located in the lower front part of the neck, just in front of the windpipe (trachea). This gland makes hormones that help control how the body uses food for energy (metabolism) as well as how the heart and brain function. These hormones also play a role in keeping  your bones strong. When the thyroid is underactive, it produces too little of the hormones thyroxine (T4) and triiodothyronine (T3).  What are the causes?  This condition may be caused by:  · Hashimoto's disease. This is a disease in which the body's disease-fighting system (immune system) attacks the thyroid gland. This is the most common cause.  · Viral infections.  · Pregnancy.  · Certain medicines.  · Birth defects.  · Past radiation treatments to the head or neck for cancer.  · Past treatment with radioactive iodine.  · Past exposure to radiation in the environment.  · Past surgical removal of part or all of the thyroid.  · Problems with a gland in the center of the brain (pituitary gland).  · Lack of enough iodine in the diet.  What increases the risk?  You are more likely to develop this condition if:  · You are female.  · You have a family history of thyroid conditions.  · You use a medicine called lithium.  · You take medicines that affect the immune system (immunosuppressants).  What are the signs or symptoms?  Symptoms of this condition include:  · Feeling as though you have no energy (lethargy).  · Not being able to tolerate cold.  · Weight gain that is not explained by a change in diet or exercise habits.  · Lack of appetite.  · Dry skin.  · Coarse hair.  · Menstrual irregularity.  · Slowing of thought processes.  · Constipation.  · Sadness or depression.  How is this diagnosed?  This condition may be diagnosed based on:  · Your symptoms, your medical history, and a physical exam.  · Blood tests.  You may also have imaging tests, such as an ultrasound or MRI.  How is this treated?  This condition is treated with medicine that replaces the thyroid hormones that your body does not make. After you begin treatment, it may take several weeks for symptoms to go away.  Follow these instructions at home:  · Take over-the-counter and prescription medicines only as told by your health care provider.  · If you  start taking any new medicines, tell your health care provider.  · Keep all follow-up visits as told by your health care provider. This is important.  ? As your condition improves, your dosage of thyroid hormone medicine may change.  ? You will need to have blood tests regularly so that your health care provider can monitor your condition.  Contact a health care provider if:  · Your symptoms do not get better with treatment.  · You are taking thyroid replacement medicine and you:  ? Sweat a lot.  ? Have tremors.  ? Feel anxious.  ? Lose weight rapidly.  ? Cannot tolerate heat.  ? Have emotional swings.  ? Have diarrhea.  ? Feel weak.  Get help right away if you have:  · Chest pain.  · An irregular heartbeat.  · A rapid heartbeat.  · Difficulty breathing.  Summary  · Hypothyroidism is when the thyroid gland does not make enough of certain hormones (it is underactive).  · When the thyroid is underactive, it produces too little of the hormones thyroxine (T4) and triiodothyronine (T3).  · The most common cause is Hashimoto's disease, a disease in which the body's disease-fighting system (immune system) attacks the thyroid gland. The condition can also be caused by viral infections, medicine, pregnancy, or past radiation treatment to the head or neck.  · Symptoms may include weight gain, dry skin, constipation, feeling as though you do not have energy, and not being able to tolerate cold.  · This condition is treated with medicine to replace the thyroid hormones that your body does not make.  This information is not intended to replace advice given to you by your health care provider. Make sure you discuss any questions you have with your health care provider.  Document Released: 12/18/2006 Document Revised: 11/28/2018 Document Reviewed: 11/28/2018  [employer] Interactive Patient Education © 2019 [employer]        Assessment/Plan     Kya was seen today for annual exam.    Diagnoses and all orders for this  visit:    Annual physical exam    Morbidly obese (CMS/HCC)  -Refused weight today.  History of eating disorder.  Advised that we need baseline weight for concerns of hypothyroidism.  She asked that we use her previous weight that was taken 2 months ago.  -Feels that she lives a healthy lifestyle with making good food choices and exercising regularly.  Does not wish to discuss weight today.    ENA (generalized anxiety disorder)  -     hydrOXYzine (ATARAX) 25 MG tablet; Take 1 tablet by mouth Every 6 (Six) Hours As Needed for Anxiety.  -     Ambulatory Referral to Psychiatry  She is currently in counseling with psychologist but is requesting psychiatrist.  She has thought about medications and feels that she may be open to trying medications for anxiety.  We will begin hydroxyzine to see if this helps.    Elevated TSH  -We will recheck this level in 3 months.  She is refusing medication at this time.    Refusing vaccines.

## 2019-12-11 NOTE — TELEPHONE ENCOUNTER
Per Dr. Toney patient needs a LEEP. [phone] called patient, left message for patient to return my call.

## 2019-12-11 NOTE — PATIENT INSTRUCTIONS
Hypothyroidism    Hypothyroidism is when the thyroid gland does not make enough of certain hormones (it is underactive). The thyroid gland is a small gland located in the lower front part of the neck, just in front of the windpipe (trachea). This gland makes hormones that help control how the body uses food for energy (metabolism) as well as how the heart and brain function. These hormones also play a role in keeping your bones strong. When the thyroid is underactive, it produces too little of the hormones thyroxine (T4) and triiodothyronine (T3).  What are the causes?  This condition may be caused by:  · Hashimoto's disease. This is a disease in which the body's disease-fighting system (immune system) attacks the thyroid gland. This is the most common cause.  · Viral infections.  · Pregnancy.  · Certain medicines.  · Birth defects.  · Past radiation treatments to the head or neck for cancer.  · Past treatment with radioactive iodine.  · Past exposure to radiation in the environment.  · Past surgical removal of part or all of the thyroid.  · Problems with a gland in the center of the brain (pituitary gland).  · Lack of enough iodine in the diet.  What increases the risk?  You are more likely to develop this condition if:  · You are female.  · You have a family history of thyroid conditions.  · You use a medicine called lithium.  · You take medicines that affect the immune system (immunosuppressants).  What are the signs or symptoms?  Symptoms of this condition include:  · Feeling as though you have no energy (lethargy).  · Not being able to tolerate cold.  · Weight gain that is not explained by a change in diet or exercise habits.  · Lack of appetite.  · Dry skin.  · Coarse hair.  · Menstrual irregularity.  · Slowing of thought processes.  · Constipation.  · Sadness or depression.  How is this diagnosed?  This condition may be diagnosed based on:  · Your symptoms, your medical history, and a physical exam.  · Blood  tests.  You may also have imaging tests, such as an ultrasound or MRI.  How is this treated?  This condition is treated with medicine that replaces the thyroid hormones that your body does not make. After you begin treatment, it may take several weeks for symptoms to go away.  Follow these instructions at home:  · Take over-the-counter and prescription medicines only as told by your health care provider.  · If you start taking any new medicines, tell your health care provider.  · Keep all follow-up visits as told by your health care provider. This is important.  ? As your condition improves, your dosage of thyroid hormone medicine may change.  ? You will need to have blood tests regularly so that your health care provider can monitor your condition.  Contact a health care provider if:  · Your symptoms do not get better with treatment.  · You are taking thyroid replacement medicine and you:  ? Sweat a lot.  ? Have tremors.  ? Feel anxious.  ? Lose weight rapidly.  ? Cannot tolerate heat.  ? Have emotional swings.  ? Have diarrhea.  ? Feel weak.  Get help right away if you have:  · Chest pain.  · An irregular heartbeat.  · A rapid heartbeat.  · Difficulty breathing.  Summary  · Hypothyroidism is when the thyroid gland does not make enough of certain hormones (it is underactive).  · When the thyroid is underactive, it produces too little of the hormones thyroxine (T4) and triiodothyronine (T3).  · The most common cause is Hashimoto's disease, a disease in which the body's disease-fighting system (immune system) attacks the thyroid gland. The condition can also be caused by viral infections, medicine, pregnancy, or past radiation treatment to the head or neck.  · Symptoms may include weight gain, dry skin, constipation, feeling as though you do not have energy, and not being able to tolerate cold.  · This condition is treated with medicine to replace the thyroid hormones that your body does not make.  This information  is not intended to replace advice given to you by your health care provider. Make sure you discuss any questions you have with your health care provider.  Document Released: 12/18/2006 Document Revised: 11/28/2018 Document Reviewed: 11/28/2018  [employer] Interactive Patient Education © 2019 [employer]

## 2019-12-17 ENCOUNTER — TELEPHONE (OUTPATIENT)
Dept: OBSTETRICS AND GYNECOLOGY | Facility: CLINIC | Age: 34
End: 2019-12-17

## 2019-12-17 NOTE — TELEPHONE ENCOUNTER
Watt pt returning call. Please contact back. Pt states you can leave message on vm-unable to answer phone

## 2019-12-17 NOTE — TELEPHONE ENCOUNTER
[phone] spoke with patient regarding her Colpo results and the need for a LEEP. Patient verbalized understanding. I scheduled patient for a LEEP on 01/14/2020 @ 11am.

## 2019-12-17 NOTE — TELEPHONE ENCOUNTER
[phone] spoke with patient regarding her Colpo results and the need for a LEEP. Patient verbalized understanding. I scheduled patient for a LEEP on 01/14/2020 @ 11am.

## 2019-12-17 NOTE — TELEPHONE ENCOUNTER
[phone] called patient back and left a message for her to return my call regarding her Colpo result and the need for a LEEP.

## 2019-12-17 NOTE — TELEPHONE ENCOUNTER
[phone] called patient and left a message for her to return my call regarding her Colpo result and the need for a LEEP.

## 2019-12-17 NOTE — TELEPHONE ENCOUNTER
[phone] spoke with patient regarding her Colpo results and the need for a LEEP. Patient verbalized understanding. I scheduled patient for a LEEP on 01/14/2020 @ 11am.

## 2020-01-14 ENCOUNTER — OFFICE VISIT (OUTPATIENT)
Dept: OBSTETRICS AND GYNECOLOGY | Facility: CLINIC | Age: 35
End: 2020-01-14

## 2020-01-14 VITALS
SYSTOLIC BLOOD PRESSURE: 108 MMHG | DIASTOLIC BLOOD PRESSURE: 66 MMHG | RESPIRATION RATE: 16 BRPM | BODY MASS INDEX: 45.17 KG/M2 | HEIGHT: 63 IN

## 2020-01-14 DIAGNOSIS — N87.0 MILD DYSPLASIA OF CERVIX (CIN I): Primary | ICD-10-CM

## 2020-01-14 DIAGNOSIS — R87.810 CERVICAL HIGH RISK HUMAN PAPILLOMAVIRUS (HPV) DNA TEST POSITIVE: ICD-10-CM

## 2020-01-14 PROCEDURE — 57461 CONZ OF CERVIX W/SCOPE LEEP: CPT | Performed by: OBSTETRICS & GYNECOLOGY

## 2020-01-14 NOTE — PROGRESS NOTES
Colposcopy with LLETZ    Date of procedure:  1/14/2020   Risks and benefits discussed? yes   All questions answered? yes   Consents given by: patient   Written consent obtained? yes   Pre-op indication: LGSIL - mild dysplasia        Procedure documentation:  The cervix was initially viewed colposcopically through a green filter.  The cervix was next bathed in acetic acid.   The findings were as follows:    Transformation zone seen? Yes   Findings: 1. Acetowhite noted at 6 o'clock   Ectocervical biopsies: not obtained.   Endocervical curettage: not performed               Colposcopic Impression: 1. Adequate colposcopy  2. Colposcopic findings are consistent with PAP         LEEP documentation:  The cervix was injected with yes - 10 cc's of  Meds; anesthesia local: 1% lidocaine with epinephrine.  Lugol's solution was not used to stain the ectocervix.  Using a 15 mm LOOP electrode, an ectocervical LLETZ was performed. .  Ball cautery of the base was performed and Monsel's solution was liberally applied.    Follow-up: 1. No tampons, douching or intercourse  2. Recheck in 2 weeks  3. Will call with pathology results in ~ 1 week.  4. Repeat the Pap smear within 1 year       This note was electronically signed.    Hardy Toney MD    January 14, 2020

## 2020-01-20 ENCOUNTER — TELEPHONE (OUTPATIENT)
Dept: OBSTETRICS AND GYNECOLOGY | Facility: CLINIC | Age: 35
End: 2020-01-20

## 2020-01-20 NOTE — TELEPHONE ENCOUNTER
Patient was called and informed that the LEEP cone biopsy results have returned.  They were read as mild dysplasia with ectocervical and endocervical margins free.  The patient will repeat her Pap smear within 1 year.    Hardy Toney MD

## 2020-03-12 ENCOUNTER — OFFICE VISIT (OUTPATIENT)
Dept: FAMILY MEDICINE CLINIC | Facility: CLINIC | Age: 35
End: 2020-03-12

## 2020-03-12 ENCOUNTER — APPOINTMENT (OUTPATIENT)
Dept: LAB | Facility: HOSPITAL | Age: 35
End: 2020-03-12

## 2020-03-12 VITALS
WEIGHT: 268 LBS | SYSTOLIC BLOOD PRESSURE: 104 MMHG | HEART RATE: 55 BPM | BODY MASS INDEX: 47.48 KG/M2 | OXYGEN SATURATION: 99 % | HEIGHT: 63 IN | DIASTOLIC BLOOD PRESSURE: 66 MMHG

## 2020-03-12 DIAGNOSIS — F33.41 RECURRENT MAJOR DEPRESSIVE DISORDER, IN PARTIAL REMISSION (HCC): ICD-10-CM

## 2020-03-12 DIAGNOSIS — E55.9 VITAMIN D DEFICIENCY: ICD-10-CM

## 2020-03-12 DIAGNOSIS — F41.9 ANXIETY: ICD-10-CM

## 2020-03-12 DIAGNOSIS — F43.10 PTSD (POST-TRAUMATIC STRESS DISORDER): ICD-10-CM

## 2020-03-12 DIAGNOSIS — Z13.29 THYROID DISORDER SCREENING: Primary | ICD-10-CM

## 2020-03-12 PROBLEM — Z78.9: Status: ACTIVE | Noted: 2017-08-30

## 2020-03-12 PROBLEM — N87.0 MILD DYSPLASIA OF CERVIX: Status: ACTIVE | Noted: 2019-12-09

## 2020-03-12 LAB
25(OH)D3 SERPL-MCNC: 30.2 NG/ML (ref 30–100)
T4 FREE SERPL-MCNC: 1.16 NG/DL (ref 0.93–1.7)
TSH SERPL DL<=0.05 MIU/L-ACNC: 3.36 UIU/ML (ref 0.27–4.2)

## 2020-03-12 PROCEDURE — 99213 OFFICE O/P EST LOW 20 MIN: CPT | Performed by: NURSE PRACTITIONER

## 2020-03-12 PROCEDURE — 84443 ASSAY THYROID STIM HORMONE: CPT | Performed by: NURSE PRACTITIONER

## 2020-03-12 PROCEDURE — 82306 VITAMIN D 25 HYDROXY: CPT | Performed by: NURSE PRACTITIONER

## 2020-03-12 PROCEDURE — 84439 ASSAY OF FREE THYROXINE: CPT | Performed by: NURSE PRACTITIONER

## 2020-03-12 NOTE — PATIENT INSTRUCTIONS
Heart Disease Prevention  Heart disease is the leading cause of death in the world. Coronary artery disease is the most common cause of heart disease. This condition results when cholesterol and other substances (plaque) build up inside the walls of the blood vessels that supply your heart muscle (arteries). This buildup in arteries is called atherosclerosis. You can take actions to lower your risk of heart disease.  How can heart disease affect me?  Heart disease can cause many unpleasant symptoms and complications, such as:  · Chest pain (angina).  · Reduced or blocked blood flow to your heart. This can cause:  ? Irregular heartbeats (arrhythmias).  ? Heart attack.  ? Heart failure.  What can increase my risk?  The following factors may make you more likely to develop this condition:  · High blood pressure (hypertension).  · High cholesterol.  · Smoking.  · A diet high in saturated fats or trans fats.  · Lack of physical activity.  · Obesity.  · Drinking too much alcohol.  · Diabetes.  · Having a family history of heart disease.  What actions can I take to prevent heart disease?  Nutrition    · Eat a heart-healthy eating plan as told by your health care provider. Examples include the DASH (Dietary Approaches to Stop Hypertension) eating plan or the Mediterranean diet.  · Generally, it is recommended that you:  ? Eat less salt (sodium). Ask your health care provider how much sodium is safe for you. Most people should have less than 2,300 mg each day.  ? Limit unhealthy fats, such as saturated and trans fats, in your diet. You can do this by eating low-fat dairy products, eating less red meat, and avoiding processed foods.  ? Eat healthy fats (omega-3 fatty acids). These are found in fish, such as mackerel or salmon.  ? Eat more fruits and vegetables. You should try to fill one-half of your plate with fruits and vegetables at each meal.  ? Eat more whole grains.  ? Avoid foods and drinks that have added  sugars.  Lifestyle    · Get regular exercise. This is one of the most important things you can do for your health. Generally, it is recommended that you:  ? Exercise for at least 30 minutes on most days of the week (150 minutes each week). The exercise should increase your heart rate and make you sweat (aerobic exercise).  ? Add strength exercises on at least 2 days each week.  · Do not use any products that contain nicotine or tobacco, such as cigarettes and e-cigarettes. These can damage your heart and blood vessels. If you need help quitting, ask your health care provider.  Alcohol use  · Do not drink alcohol if:  ? Your health care provider tells you not to drink.  ? You are pregnant, may be pregnant, or are planning to become pregnant.  · If you drink alcohol, limit how much you have:  ? 0-1 drink a day for women.  ? 0-2 drinks a day for men.  · Be aware of how much alcohol is in your drink. In the [place] one drink equals one typical bottle of beer (12 oz), one-half glass of wine (5 oz), or one shot of hard liquor (1½ oz).  Medicines  · Take over-the-counter and prescription medicines only as told by your health care provider.  · Ask your health care provider whether you should take an aspirin every day. Taking aspirin may help reduce your risk of heart disease and stroke.  · Depending on your risk factors, your health care provider may prescribe medicines to lower your risk of heart disease or to control related conditions. You may take medicine to:  ? Lower cholesterol.  ? Control blood pressure.  ? Control diabetes.  General information  · Keep your blood pressure under control, as recommended by your health care provider. For most healthy people, the upper number of your blood pressure (systolic) should be no higher than 120, and the lower number (diastolic) no higher than 80. Treatment may be needed if your blood pressure is higher than 130/80.  · Have your blood pressure checked at least every two years.  Your health care provider may check your blood pressure more often if you have high blood pressure.  · After age 20, have your cholesterol checked every 4-6 years. If you have risk factors for heart disease, you may need to have it checked more frequently. Treatment may be needed if your cholesterol is high.  · Have your body mass index (BMI) checked every year. Your health care provider can calculate your BMI from your height and weight.  · Work with your health care provider to lose weight, if needed, or to maintain a healthy weight.  Where to find more information:  · Centers for Disease Control and Prevention: www.cdc.gov/heartdisease  · American Heart Association: www.heart.org  ? Take a free online heart disease risk quiz to better understand your personal risk factors.  Summary  · Heart disease is the leading cause of death in the world.  · Heart disease can cause chest pain, abnormal heart rhythms, heart attack, and heart failure.  · High blood pressure, high cholesterol, and smoking are the main risk factors for heart disease, although other factors also contribute.  · You can take actions to lower your chances of developing heart disease. Work with your health care provider to reduce your risk by following a heart-healthy diet, being physically active, and controlling your weight, blood pressure, and cholesterol level.  This information is not intended to replace advice given to you by your health care provider. Make sure you discuss any questions you have with your health care provider.  Document Released: 08/01/2005 Document Revised: 01/02/2019 Document Reviewed: 01/02/2019  [employer] Interactive Patient Education © 2020 [employer]    Health Maintenance, Female  Adopting a healthy lifestyle and getting preventive care are important in promoting health and wellness. Ask your health care provider about:  · The right schedule for you to have regular tests and exams.  · Things you can do on your own to  prevent diseases and keep yourself healthy.  What should I know about diet, weight, and exercise?  Eat a healthy diet    · Eat a diet that includes plenty of vegetables, fruits, low-fat dairy products, and lean protein.  · Do not eat a lot of foods that are high in solid fats, added sugars, or sodium.  Maintain a healthy weight  Body mass index (BMI) is used to identify weight problems. It estimates body fat based on height and weight. Your health care provider can help determine your BMI and help you achieve or maintain a healthy weight.  Get regular exercise  Get regular exercise. This is one of the most important things you can do for your health. Most adults should:  · Exercise for at least 150 minutes each week. The exercise should increase your heart rate and make you sweat (moderate-intensity exercise).  · Do strengthening exercises at least twice a week. This is in addition to the moderate-intensity exercise.  · Spend less time sitting. Even light physical activity can be beneficial.  Watch cholesterol and blood lipids  Have your blood tested for lipids and cholesterol at 20 years of age, then have this test every 5 years.  Have your cholesterol levels checked more often if:  · Your lipid or cholesterol levels are high.  · You are older than 40 years of age.  · You are at high risk for heart disease.  What should I know about cancer screening?  Depending on your health history and family history, you may need to have cancer screening at various ages. This may include screening for:  · Breast cancer.  · Cervical cancer.  · Colorectal cancer.  · Skin cancer.  · Lung cancer.  What should I know about heart disease, diabetes, and high blood pressure?  Blood pressure and heart disease  · High blood pressure causes heart disease and increases the risk of stroke. This is more likely to develop in people who have high blood pressure readings, are of  descent, or are overweight.  · Have your blood pressure  checked:  ? Every 3-5 years if you are 18-39 years of age.  ? Every year if you are 40 years old or older.  Diabetes  Have regular diabetes screenings. This checks your fasting blood sugar level. Have the screening done:  · Once every three years after age 40 if you are at a normal weight and have a low risk for diabetes.  · More often and at a younger age if you are overweight or have a high risk for diabetes.  What should I know about preventing infection?  Hepatitis B  If you have a higher risk for hepatitis B, you should be screened for this virus. Talk with your health care provider to find out if you are at risk for hepatitis B infection.  Hepatitis C  Testing is recommended for:  · Everyone born from 1945 through 1965.  · Anyone with known risk factors for hepatitis C.  Sexually transmitted infections (STIs)  · Get screened for STIs, including gonorrhea and chlamydia, if:  ? You are sexually active and are younger than 24 years of age.  ? You are older than 24 years of age and your health care provider tells you that you are at risk for this type of infection.  ? Your sexual activity has changed since you were last screened, and you are at increased risk for chlamydia or gonorrhea. Ask your health care provider if you are at risk.  · Ask your health care provider about whether you are at high risk for HIV. Your health care provider may recommend a prescription medicine to help prevent HIV infection. If you choose to take medicine to prevent HIV, you should first get tested for HIV. You should then be tested every 3 months for as long as you are taking the medicine.  Pregnancy  · If you are about to stop having your period (premenopausal) and you may become pregnant, seek counseling before you get pregnant.  · Take 400 to 800 micrograms (mcg) of folic acid every day if you become pregnant.  · Ask for birth control (contraception) if you want to prevent pregnancy.  Osteoporosis and menopause  Osteoporosis is a  disease in which the bones lose minerals and strength with aging. This can result in bone fractures. If you are 65 years old or older, or if you are at risk for osteoporosis and fractures, ask your health care provider if you should:  · Be screened for bone loss.  · Take a calcium or vitamin D supplement to lower your risk of fractures.  · Be given hormone replacement therapy (HRT) to treat symptoms of menopause.  Follow these instructions at home:  Lifestyle  · Do not use any products that contain nicotine or tobacco, such as cigarettes, e-cigarettes, and chewing tobacco. If you need help quitting, ask your health care provider.  · Do not use street drugs.  · Do not share needles.  · Ask your health care provider for help if you need support or information about quitting drugs.  Alcohol use  · Do not drink alcohol if:  ? Your health care provider tells you not to drink.  ? You are pregnant, may be pregnant, or are planning to become pregnant.  · If you drink alcohol:  ? Limit how much you use to 0-1 drink a day.  ? Limit intake if you are breastfeeding.  · Be aware of how much alcohol is in your drink. In the [place] one drink equals one 12 oz bottle of beer (355 mL), one 5 oz glass of wine (148 mL), or one 1½ oz glass of hard liquor (44 mL).  General instructions  · Schedule regular health, dental, and eye exams.  · Stay current with your vaccines.  · Tell your health care provider if:  ? You often feel depressed.  ? You have ever been abused or do not feel safe at home.  Summary  · Adopting a healthy lifestyle and getting preventive care are important in promoting health and wellness.  · Follow your health care provider's instructions about healthy diet, exercising, and getting tested or screened for diseases.  · Follow your health care provider's instructions on monitoring your cholesterol and blood pressure.  This information is not intended to replace advice given to you by your health care provider. Make sure  you discuss any questions you have with your health care provider.  Document Released: 07/02/2012 Document Revised: 12/11/2019 Document Reviewed: 12/11/2019  [employer] Interactive Patient Education © 2020 [employer]
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
## 2020-12-10 ENCOUNTER — LAB (OUTPATIENT)
Dept: LAB | Facility: HOSPITAL | Age: 35
End: 2020-12-10

## 2020-12-10 ENCOUNTER — OFFICE VISIT (OUTPATIENT)
Dept: FAMILY MEDICINE CLINIC | Facility: CLINIC | Age: 35
End: 2020-12-10

## 2020-12-10 VITALS — OXYGEN SATURATION: 99 % | SYSTOLIC BLOOD PRESSURE: 92 MMHG | HEART RATE: 81 BPM | DIASTOLIC BLOOD PRESSURE: 58 MMHG

## 2020-12-10 DIAGNOSIS — E55.9 VITAMIN D DEFICIENCY: ICD-10-CM

## 2020-12-10 DIAGNOSIS — Z00.00 HEALTHCARE MAINTENANCE: ICD-10-CM

## 2020-12-10 DIAGNOSIS — Z11.59 ENCOUNTER FOR HEPATITIS C SCREENING TEST FOR LOW RISK PATIENT: ICD-10-CM

## 2020-12-10 DIAGNOSIS — L65.9 PATCHY LOSS OF HAIR: ICD-10-CM

## 2020-12-10 DIAGNOSIS — F90.9 ATTENTION DEFICIT HYPERACTIVITY DISORDER (ADHD), UNSPECIFIED ADHD TYPE: Primary | ICD-10-CM

## 2020-12-10 DIAGNOSIS — K21.9 GASTROESOPHAGEAL REFLUX DISEASE, UNSPECIFIED WHETHER ESOPHAGITIS PRESENT: ICD-10-CM

## 2020-12-10 DIAGNOSIS — M25.511 ACUTE PAIN OF RIGHT SHOULDER: ICD-10-CM

## 2020-12-10 LAB
25(OH)D3 SERPL-MCNC: 37.9 NG/ML (ref 30–100)
ALBUMIN SERPL-MCNC: 4.4 G/DL (ref 3.5–5.2)
ALBUMIN/GLOB SERPL: 1.3 G/DL
ALP SERPL-CCNC: 56 U/L (ref 39–117)
ALT SERPL W P-5'-P-CCNC: 16 U/L (ref 1–33)
ANION GAP SERPL CALCULATED.3IONS-SCNC: 12.9 MMOL/L (ref 5–15)
AST SERPL-CCNC: 20 U/L (ref 1–32)
BASOPHILS # BLD AUTO: 0.03 10*3/MM3 (ref 0–0.2)
BASOPHILS NFR BLD AUTO: 0.4 % (ref 0–1.5)
BILIRUB SERPL-MCNC: 0.3 MG/DL (ref 0–1.2)
BUN SERPL-MCNC: 10 MG/DL (ref 6–20)
BUN/CREAT SERPL: 15.6 (ref 7–25)
CALCIUM SPEC-SCNC: 9.5 MG/DL (ref 8.6–10.5)
CHLORIDE SERPL-SCNC: 104 MMOL/L (ref 98–107)
CHOLEST SERPL-MCNC: 189 MG/DL (ref 0–200)
CO2 SERPL-SCNC: 24.1 MMOL/L (ref 22–29)
CREAT SERPL-MCNC: 0.64 MG/DL (ref 0.57–1)
DEPRECATED RDW RBC AUTO: 41.1 FL (ref 37–54)
EOSINOPHIL # BLD AUTO: 0.12 10*3/MM3 (ref 0–0.4)
EOSINOPHIL NFR BLD AUTO: 1.6 % (ref 0.3–6.2)
ERYTHROCYTE [DISTWIDTH] IN BLOOD BY AUTOMATED COUNT: 12.2 % (ref 12.3–15.4)
GFR SERPL CREATININE-BSD FRML MDRD: 107 ML/MIN/1.73
GLOBULIN UR ELPH-MCNC: 3.3 GM/DL
GLUCOSE SERPL-MCNC: 89 MG/DL (ref 65–99)
HCT VFR BLD AUTO: 41.8 % (ref 34–46.6)
HCV AB SER DONR QL: NORMAL
HDLC SERPL-MCNC: 63 MG/DL (ref 40–60)
HGB BLD-MCNC: 14 G/DL (ref 12–15.9)
IMM GRANULOCYTES # BLD AUTO: 0.04 10*3/MM3 (ref 0–0.05)
IMM GRANULOCYTES NFR BLD AUTO: 0.5 % (ref 0–0.5)
LDLC SERPL CALC-MCNC: 113 MG/DL (ref 0–100)
LDLC/HDLC SERPL: 1.78 {RATIO}
LYMPHOCYTES # BLD AUTO: 1.91 10*3/MM3 (ref 0.7–3.1)
LYMPHOCYTES NFR BLD AUTO: 25.2 % (ref 19.6–45.3)
MCH RBC QN AUTO: 31 PG (ref 26.6–33)
MCHC RBC AUTO-ENTMCNC: 33.5 G/DL (ref 31.5–35.7)
MCV RBC AUTO: 92.7 FL (ref 79–97)
MONOCYTES # BLD AUTO: 0.74 10*3/MM3 (ref 0.1–0.9)
MONOCYTES NFR BLD AUTO: 9.7 % (ref 5–12)
NEUTROPHILS NFR BLD AUTO: 4.75 10*3/MM3 (ref 1.7–7)
NEUTROPHILS NFR BLD AUTO: 62.6 % (ref 42.7–76)
NRBC BLD AUTO-RTO: 0 /100 WBC (ref 0–0.2)
PLATELET # BLD AUTO: 216 10*3/MM3 (ref 140–450)
PMV BLD AUTO: 11.1 FL (ref 6–12)
POTASSIUM SERPL-SCNC: 4.8 MMOL/L (ref 3.5–5.2)
PROT SERPL-MCNC: 7.7 G/DL (ref 6–8.5)
RBC # BLD AUTO: 4.51 10*6/MM3 (ref 3.77–5.28)
SODIUM SERPL-SCNC: 141 MMOL/L (ref 136–145)
TRIGL SERPL-MCNC: 70 MG/DL (ref 0–150)
TSH SERPL DL<=0.05 MIU/L-ACNC: 2.73 UIU/ML (ref 0.27–4.2)
VLDLC SERPL-MCNC: 13 MG/DL (ref 5–40)
WBC # BLD AUTO: 7.59 10*3/MM3 (ref 3.4–10.8)

## 2020-12-10 PROCEDURE — 80061 LIPID PANEL: CPT

## 2020-12-10 PROCEDURE — 99214 OFFICE O/P EST MOD 30 MIN: CPT | Performed by: NURSE PRACTITIONER

## 2020-12-10 PROCEDURE — 82306 VITAMIN D 25 HYDROXY: CPT

## 2020-12-10 PROCEDURE — 80050 GENERAL HEALTH PANEL: CPT

## 2020-12-10 PROCEDURE — 86376 MICROSOMAL ANTIBODY EACH: CPT

## 2020-12-10 PROCEDURE — 86803 HEPATITIS C AB TEST: CPT

## 2020-12-10 PROCEDURE — 36415 COLL VENOUS BLD VENIPUNCTURE: CPT

## 2020-12-10 RX ORDER — AMOXICILLIN 500 MG
CAPSULE ORAL
COMMUNITY
Start: 2020-10-01 | End: 2023-01-27

## 2020-12-10 NOTE — PROGRESS NOTES
"Subjective   Kya Dempsey is a 35 y.o. female.   Chief Complaint   Patient presents with   • Alopecia   • Shoulder Pain   • Thyroid Problem     wants complete blood work       History of Present Illness   Patient is here with multiple complaints, understands that we will need to limit to her main concern about hair loss, wants to have thyroid checked and right shoulder pain. Has noticed hair loss over past 3 weeks, felt a \"bald spot\", is concerned about her thyroid, but has also been under more stress lately. Does have history of scalp dermatitis  Also complaint of right shoulder pain x 1 month, started a new job around the same time. Repetitive reaching at job. Shoulder pain is intermittent achy pain.  Sees a therapist for depression and anxiety, recently diagnosed ADHD, has not been started on medication.  Continues to smoke marijuana everyday to help with anxiety    The following portions of the patient's history were reviewed and updated as appropriate: allergies, current medications, past family history, past medical history, past social history, past surgical history and problem list.    Review of Systems   Constitutional: Positive for fatigue. Negative for chills, fever and unexpected weight change.   Eyes: Negative for visual disturbance.   Respiratory: Positive for cough (in the morning). Negative for shortness of breath and wheezing.    Cardiovascular: Negative for chest pain, palpitations and leg swelling.   Gastrointestinal: Positive for abdominal pain (epigastric) and diarrhea (IBS). Negative for blood in stool and constipation.   Endocrine: Negative for cold intolerance and heat intolerance.   Genitourinary: Negative for difficulty urinating, dysuria and menstrual problem.   Musculoskeletal: Positive for arthralgias and back pain.   Neurological: Positive for headaches. Negative for dizziness and light-headedness.   Psychiatric/Behavioral: Positive for decreased concentration and dysphoric mood. " Negative for self-injury, sleep disturbance and suicidal ideas. The patient is nervous/anxious and is hyperactive.        Objective   Physical Exam  Vitals signs reviewed.   Constitutional:       General: She is not in acute distress.     Appearance: Normal appearance. She is obese. She is not ill-appearing, toxic-appearing or diaphoretic.   HENT:      Head: Normocephalic and atraumatic.      Right Ear: Tympanic membrane normal.      Left Ear: Tympanic membrane normal.   Eyes:      General: No scleral icterus.     Conjunctiva/sclera: Conjunctivae normal.   Neck:      Vascular: No carotid bruit.   Cardiovascular:      Rate and Rhythm: Normal rate and regular rhythm.      Pulses: Normal pulses.      Heart sounds: Normal heart sounds. No murmur.   Pulmonary:      Effort: Pulmonary effort is normal. No respiratory distress.      Breath sounds: Normal breath sounds. No stridor. No wheezing, rhonchi or rales.   Chest:      Chest wall: No tenderness.   Abdominal:      Palpations: Abdomen is soft.   Musculoskeletal:         General: No swelling, tenderness, deformity or signs of injury.   Lymphadenopathy:      Cervical: No cervical adenopathy.   Skin:     General: Skin is warm and dry.      Comments: Unable to find any bald spots , normal distribution of hair, no scalp lesions noted   Neurological:      Mental Status: She is alert and oriented to person, place, and time.   Psychiatric:         Attention and Perception: Attention normal.         Mood and Affect: Mood is anxious.         Speech: Speech is rapid and pressured.         Behavior: Behavior is hyperactive.         Thought Content: Thought content is not paranoid or delusional. Thought content does not include homicidal or suicidal ideation. Thought content does not include homicidal or suicidal plan.         Cognition and Memory: Cognition normal.         Judgment: Judgment normal.       BP 92/58   Pulse 81   SpO2 99%     Assessment/Plan   Diagnoses and all  orders for this visit:    1. Attention deficit hyperactivity disorder (ADHD), unspecified ADHD type (Primary)    2. Patchy loss of hair  -     TSH Rfx On Abnormal To Free T4; Future  -     Thyroid Peroxidase Antibody; Future    3. Healthcare maintenance  -     CBC Auto Differential; Future  -     Comprehensive Metabolic Panel; Future  -     Lipid Panel; Future    4. Vitamin D deficiency  -     Vitamin D 25 Hydroxy; Future    5. Gastroesophageal reflux disease, unspecified whether esophagitis present    6. Encounter for hepatitis C screening test for low risk patient  -     Hepatitis C Antibody; Future    7. Acute pain of right shoulder      Encouraged patient to discuss ADHD treatment options with therapist/psychiatrist  Screening labs ordered to evaluate chronic conditions. I will contact patient regarding test results and provide instructions regarding any necessary changes in plan of care.  Follow up with her dermatologist if she continues to notice hair loss, check thyroid labs today  Provided information on food choices to help reduce GERD  May take OTC famotidine   No shoulder pain in office today, provided information and rehab exercises. Appears to be related to repetitive reaching at new job  Provided information about marijuana use, may actually contribute to anxiety,try to cut back.  Patient was encouraged to keep me informed of any acute changes, lack of improvement, or any new concerning symptoms.

## 2020-12-10 NOTE — PATIENT INSTRUCTIONS
Shoulder Pain  Many things can cause shoulder pain, including:  · An injury.  · Moving the shoulder in the same way again and again (overuse).  · Joint pain (arthritis).  Pain can come from:  · Swelling and irritation (inflammation) of any part of the shoulder.  · An injury to the shoulder joint.  · An injury to:  ? Tissues that connect muscle to bone (tendons).  ? Tissues that connect bones to each other (ligaments).  ? Bones.  Follow these instructions at home:  Watch for changes in your symptoms. Let your doctor know about them. Follow these instructions to help with your pain.  If you have a sling:  · Wear the sling as told by your doctor. Remove it only as told by your doctor.  · Loosen the sling if your fingers:  ? Tingle.  ? Become numb.  ? Turn cold and blue.  · Keep the sling clean.  · If the sling is not waterproof:  ? Do not let it get wet.  ? Take the sling off when you shower or bathe.  Managing pain, stiffness, and swelling    · If told, put ice on the painful area:  ? Put ice in a plastic bag.  ? Place a towel between your skin and the bag.  ? Leave the ice on for 20 minutes, 2-3 times a day. Stop putting ice on if it does not help with the pain.  · Squeeze a soft ball or a foam pad as much as possible. This prevents swelling in the shoulder. It also helps to strengthen the arm.  General instructions  · Take over-the-counter and prescription medicines only as told by your doctor.  · Keep all follow-up visits as told by your doctor. This is important.  Contact a doctor if:  · Your pain gets worse.  · Medicine does not help your pain.  · You have new pain in your arm, hand, or fingers.  Get help right away if:  · Your arm, hand, or fingers:  ? Tingle.  ? Are numb.  ? Are swollen.  ? Are painful.  ? Turn white or blue.  Summary  · Shoulder pain can be caused by many things. These include injury, moving the shoulder in the same away again and again, and joint pain.  · Watch for changes in your symptoms.  Let your doctor know about them.  · This condition may be treated with a sling, ice, and pain medicine.  · Contact your doctor if the pain gets worse or you have new pain. Get help right away if your arm, hand, or fingers tingle or get numb, swollen, or painful.  · Keep all follow-up visits as told by your doctor. This is important.  This information is not intended to replace advice given to you by your health care provider. Make sure you discuss any questions you have with your health care provider.  Document Revised: 07/02/2019 Document Reviewed: 07/02/2019  [employer] Patient Education © 2020 [employer]    Food Choices for Gastroesophageal Reflux Disease, Adult  When you have gastroesophageal reflux disease (GERD), the foods you eat and your eating habits are very important. Choosing the right foods can help ease your discomfort. Think about working with a nutrition specialist (dietitian) to help you make good choices.  What are tips for following this plan?    Meals  · Choose healthy foods that are low in fat, such as fruits, vegetables, whole grains, low-fat dairy products, and lean meat, fish, and poultry.  · Eat small meals often instead of 3 large meals a day. Eat your meals slowly, and in a place where you are relaxed. Avoid bending over or lying down until 2-3 hours after eating.  · Avoid eating meals 2-3 hours before bed.  · Avoid drinking a lot of liquid with meals.  · Cook foods using methods other than frying. Bake, grill, or broil food instead.  · Avoid or limit:  ? Chocolate.  ? Peppermint or spearmint.  ? Alcohol.  ? Pepper.  ? Black and decaffeinated coffee.  ? Black and decaffeinated tea.  ? Bubbly (carbonated) soft drinks.  ? Caffeinated energy drinks and soft drinks.  · Limit high-fat foods such as:  ? Fatty meat or fried foods.  ? Whole milk, cream, butter, or ice cream.  ? Nuts and nut butters.  ? Pastries, donuts, and sweets made with butter or shortening.  · Avoid foods that cause  symptoms. These foods may be different for everyone. Common foods that cause symptoms include:  ? Tomatoes.  ? Oranges, jama, and limes.  ? Peppers.  ? Spicy food.  ? Onions and garlic.  ? Vinegar.  Lifestyle  · Maintain a healthy weight. Ask your doctor what weight is healthy for you. If you need to lose weight, work with your doctor to do so safely.  · Exercise for at least 30 minutes for 5 or more days each week, or as told by your doctor.  · Wear loose-fitting clothes.  · Do not smoke. If you need help quitting, ask your doctor.  · Sleep with the head of your bed higher than your feet. Use a wedge under the mattress or blocks under the bed frame to raise the head of the bed.  Summary  · When you have gastroesophageal reflux disease (GERD), food and lifestyle choices are very important in easing your symptoms.  · Eat small meals often instead of 3 large meals a day. Eat your meals slowly, and in a place where you are relaxed.  · Limit high-fat foods such as fatty meat or fried foods.  · Avoid bending over or lying down until 2-3 hours after eating.  · Avoid peppermint and spearmint, caffeine, alcohol, and chocolate.  This information is not intended to replace advice given to you by your health care provider. Make sure you discuss any questions you have with your health care provider.  Document Revised: 04/09/2020 Document Reviewed: 01/23/2018  [employer] Patient Education © 2020 [employer]    What You Need to Know About Marijuana Use  Marijuana is a mixture of the dried leaves and flowers of the hemp plant Cannabis sativa. The plant's active ingredients (cannabinoids) change the chemistry of the brain. If you smoke or eat marijuana, you will experience changes in the way you think, feel, and behave.  Many people use marijuana because it helps them relax and puts them in a pleasurable mood (marijuana high). Some people use marijuana for medical effects, such as:  · Reduced nausea.  · Increased  appetite.  · Reduced muscle spasm.  · Pain relief.  Researchers are studying other possible medical uses for marijuana.  How can marijuana use affect me?  Many people find a marijuana high to be pleasurable and relaxing. Other people find a marijuana high to be uncomfortable or anxiety-causing. This drug can cause short-term and long-term physical and mental effects. Taking high doses of marijuana or trying to quit marijuana can also affect you.  Short-term effects of marijuana use include:  · Temporary relief of symptoms from a medical condition.  · Changes in mood and perception (feeling high).  · Increased hunger.  · Increased heart rate.  · Slowed movement and reaction time.  · Poor memory, judgment, and problem solving ability.  · Altered sense of time.  · Changes to vision.  · Bloodshot eyes.  · Coughing.  Long-term effects of marijuana use include:  · Higher risk of lung and breathing problems.  · Higher risk of heart attack.  · Higher risk of testicular cancer.  · Mental and physical dependence (addiction).  · Slowed brain development in young people. Babies whose mothers used marijuana during pregnancy have an increased risk of problems with brain development and behavior.  · Temporary periods of false perceptions or beliefs (hallucinations or paranoia).  · Worsening of mental illness.  · Onset of new mental illness such as anxiety, depression, or suicidal thoughts.  · Withdrawal symptoms when stopping marijuana, such as sleeplessness, anxiety, cravings, and anger.  · Difficulty maintaining healthy relationships.  · Poor memory, and difficulty concentrating and learning. This can result in decreased intelligence and poor performance at school or work, and an increased risk of dropping out of school.  · Higher risk of using other substances like alcohol and nicotine.  High doses of marijuana can cause:  · Panic.  · Anxiety.  · Mental confusion.  · Hallucinations.  Quitting marijuana after using it for a long  time can cause withdrawal symptoms, such as:  · Headache.  · Shakiness.  · Sweating.  · Stomach pain.  · Nausea.  · Restlessness.  · Irritability.  · Trouble sleeping.  · Decreased appetite.  What are the benefits of not using marijuana?  Not using marijuana can keep you from becoming dependent on it. You can avoid the negative effects of the drug that can reduce your quality of life. You can avoid accidents caused by the slowed reaction time that is common with marijuana use.  If I already use marijuana, what steps can I take to stop using it?  If you are not physically or mentally dependent on marijuana, you should be able to stop using it on your own. If you cannot stop on your own, ask your health care provider for help. Treatment for marijuana addiction is similar to treatment for other addictions. It may include:  · Cognitive-behavioral therapy (psychotherapy). This may include individual or group therapy.  · Joining a support group.  · Treating medical, behavioral, or mental health conditions that exist along with marijuana dependency.    Where can I get more information?  Learn more about:  · Marijuana from the [place] National [place] on Drug Abuse: https://www.drugabuse.gov/publications/drugfacts/marijuana  · Medical marijuana from the National Institutes of Health: https://nccih.nih.gov/health/marijuana  · Treatment options from the Substance Abuse and Mental Health Services Administration: https://findtreatment.samhsa.gov/  · Recovery from marijuana dependency from Recovery.org: http://www.recovery.org/topics/marijuana-recovery  When should I seek medical care?  Talk with your health care provider if:  · You want to stop using marijuana but you cannot.  · You have withdrawal symptoms when you try to stop using marijuana.  · You are using marijuana every day.  · You are using marijuana along with other drugs like cocaine or alcohol.  · You have anxiety or depression.  · You have hallucinations or  paranoia.  · Marijuana use is interfering with your relationships or your ability to function normally at school or at work.  Summary  · You may become physically or mentally dependent on marijuana.  · Long-term use may interfere with your ability to function normally at home, school, or work.  · Marijuana addiction is treatable.  This information is not intended to replace advice given to you by your health care provider. Make sure you discuss any questions you have with your health care provider.  Document Revised: 11/30/2018 Document Reviewed: 09/06/2017  Elsevier Patient Education © 2020 [employer]    Cannabis Use Disorder  Cannabis use disorder is when using marijuana disrupts a person's daily life or causes health problems. This condition can be dangerous. The health problems this condition can cause include:  · Long-lasting problems with thinking and learning. These can be permanent in young people.  · Severe anxiety.  · Paranoia.  · Hallucinations.  · Dangerously high blood pressure and heart rate.  · Schizophrenia.  · Breathing problems.  · Problems with child development during and after pregnancy.  People with this condition are also more likely to use other drugs.  What are the causes?  This condition is caused by using marijuana too much over time. It is not caused by using it only once in a while. Many people with this condition use marijuana because it gives them a feeling of extreme pleasure or relaxation.  What increases the risk?  This condition is more likely to develop in:  · Men.  · People with a family history of cannabis use disorder.  · People with mental health issues such as depression or post-traumatic stress disorder.  What are the signs or symptoms?  Symptoms of this condition include:  · Using greater amounts of marijuana than you want to, or using marijuana for longer than you want to.  · Craving marijuana.  · Spending a lot of time getting marijuana and using it or recovering from  its effects.  · Having problems at work, at school, at home, or in relationships because of marijuana use.  · Giving up or cutting down on important life activities because of marijuana use.  · Using marijuana at times when it is dangerous, such as while you are driving a car.  · Needing more and more marijuana to get the same effect you want from the marijuana (building up a tolerance).  · Physical problems, such as:  ? A long-lasting cough.  ? Bronchitis.  ? Emphysema.  ? Throat and lung cancer.  · Mental problems, such as:  ? Psychosis.  ? Anxiety.  ? Trouble sleeping.  · Having symptoms of withdrawal when you stop using marijuana. Symptom of withdrawal include:  ? Irritability or anger.  ? Anxiety or restlessness.  ? Trouble sleeping.  ? Loss of appetite or weight loss.  ? Aches and pains.  ? Shakiness, sweating, or chills.  How is this diagnosed?  This condition is diagnosed with an assessment. During the assessment, your health care provider will ask about your marijuana use and about how it affects your life. You will be diagnosed with the condition if you have had at least two symptoms of this condition within a 12-month period. How severe the condition is depends on how many symptoms you have.  · If you have two to three symptoms, your condition is mild.  · If you have four to five symptoms, your condition is moderate.  · If you have six or more symptoms, your condition is severe.  Your health care provider may perform a physical exam or do lab tests to see if you have physical problems resulting from marijuana use. Your health care provider may also screen for drug use and refer you to a mental health professional for evaluation.  How is this treated?  Treatment for this condition is usually provided by mental health professionals with training in substance use disorders. Your treatment may involve:  · Counseling. This treatment is also called talk therapy. It is provided by substance use treatment  counselors. A counselor can address the reasons you use marijuana and suggest ways to keep you from using it again. The goals of talk therapy are to:  ? Find healthy activities to replace using marijuana.  ? Identify and avoid the things that trigger your marijuana use.  ? Help you learn how to handle cravings.  · Support groups. Support groups are led by people who have quit using marijuana. They provide emotional support, advice, and guidance.  · Medicine. Medicine is used to treat mental health issues that trigger marijuana use or that result from it.  Follow these instructions at home:  · Take over-the-counter and prescription medicines only as told by your health care provider.  · Check with your health care provider before starting any new medicines.  · Keep all follow-up visits as told by your health care provider. This is important.  · Work with your counselor or group to develop tools to keep you from using marijuana again (relapsing).  · Make healthy lifestyle choices, such as:  ? Eating a healthy diet.  ? Getting enough exercise.  ? Improving your stress-management skills.  · Learn daily living skills and work skills.  Where to find more information  · National [place] on Drug Abuse: www.drugabuse.gov  · Substance Abuse and Mental Health Services Administration: www.samhsa.gov  Contact a health care provider if:  · You are not able to take your medicines as told.  · Your symptoms get worse.  Get help right away if:  · You have serious thoughts about hurting yourself or others.  If you ever feel like you may hurt yourself or others, or have thoughts about taking your own life, get help right away. You can go to your nearest emergency department or call:  · Your local emergency services (911 in the [place]  · A suicide crisis helpline, such as the National Suicide Prevention Lifeline at 1-185.759.2292. This is open 24 hours a day.  This information is not intended to replace advice given to you by your  health care provider. Make sure you discuss any questions you have with your health care provider.  Document Revised: 11/30/2018 Document Reviewed: 09/15/2017  [employer] Patient Education © 2020 [employer]    Living With Attention Deficit Hyperactivity Disorder  If you have been diagnosed with attention deficit hyperactivity disorder (ADHD), you may be relieved that you now know why you have felt or behaved a certain way. Still, you may feel overwhelmed about the treatment ahead. You may also wonder how to get the support you need and how to deal with the condition day-to-day. With treatment and support, you can live with ADHD and manage your symptoms.  How to manage lifestyle changes  Managing stress  Stress is your body's reaction to life changes and events, both good and bad. To cope with the stress of an ADHD diagnosis, it may help to:  · Learn more about ADHD.  · Exercise regularly. Even a short daily walk can lower stress levels.  · Participate in training or education programs (including social skills training classes) that teach you to deal with symptoms.    Medicines  Your health care provider may suggest certain medicines if he or she feels that they will help to improve your condition. Stimulant medicines are usually prescribed to treat ADHD, and therapy may also be prescribed. It is important to:  · Avoid using alcohol and other substances that may prevent your medicines from working properly (may interact).   · Talk with your pharmacist or health care provider about all the medicines that you take, their possible side effects, and what medicines are safe to take together.  · Make it your goal to take part in all treatment decisions (shared decision-making). Ask about possible side effects of medicines that your health care provider recommends, and tell him or her how you feel about having those side effects. It is best if shared decision-making with your health care provider is part of your total  treatment plan.  Relationships  To strengthen your relationships with family members while treating your condition, consider taking part in family therapy. You might also attend self-help groups alone or with a loved one.  Be honest about how your symptoms affect your relationships. Make an effort to communicate respectfully instead of fighting, and find ways to show others that you care. Psychotherapy may be useful in helping you cope with how ADHD affects your relationships.  How to recognize changes in your condition  The following signs may mean that your treatment is working well and your condition is improving:  · Consistently being on time for appointments.  · Being more organized at home and work.  · Other people noticing improvements in your behavior.  · Achieving goals that you set for yourself.  · Thinking more clearly.  The following signs may mean that your treatment is not working very well:  · Feeling impatience or more confusion.  · Missing, forgetting, or being late for appointments.  · An increasing sense of disorganization and messiness.  · More difficulty in reaching goals that you set for yourself.  · Loved ones becoming angry or frustrated with you.  Where to find support  Talking to others  · Keep emotion out of important discussions and speak in a calm, logical way.  · Listen closely and patiently to your loved ones. Try to understand their point of view, and try to avoid getting defensive.  · Take responsibility for the consequences of your actions.  · Ask that others do not take your behaviors personally.  · Aim to solve problems as they come up, and express your feelings instead of bottling them up.  · Talk openly about what you need from your loved ones and how they can support you.  · Consider going to family therapy sessions or having your family meet with a specialist who deals with ADHD-related behavior problems.  Finances  Not all insurance plans cover mental health care, so it is  important to check with your insurance carrier. If paying for co-pays or counseling services is a problem, search for a local or [facility] mental health care center. Public mental health care services may be offered there at a low cost or no cost when you are not able to see a private health care provider.  If you are taking medicine for ADHD, you may be able to get the generic form, which may be less expensive than brand-name medicine. Some makers of prescription medicines also offer help to patients who cannot afford the medicines that they need.  Follow these instructions at home:  · Take over-the-counter and prescription medicines only as told by your health care provider. Check with your health care provider before taking any new medicines.  · Create structure and an organized atmosphere at home. For example:  ? Make a list of tasks, then rank them from most important to least important. Work on one task at a time until your listed tasks are done.  ? Make a daily schedule and follow it consistently every day.  ? Use an appointment calendar, and check it 2 or 3 times a day to keep on track. Keep it with you when you leave the house.  ? Create spaces where you keep certain things, and always put things back in their places after you use them.  · Keep all follow-up visits as told by your health care provider. This is important.  Questions to ask your health care provider:  · What are the risks and benefits of taking medicines?  · Would I benefit from therapy?  · How often should I follow up with a health care provider?  Contact a health care provider if:  · You have side effects from your medicines, such as:  ? Repeated muscle twitches, coughing, or speech outbursts.  ? Sleep problems.  ? Loss of appetite.  ? Depression.  ? New or worsening behavior problems.  ? Dizziness.  ? Unusually fast heartbeat.  ? Stomach pains.  ? Headaches.  Get help right away if:  · You have a severe reaction to a medicine.  · Your  behavior suddenly gets worse.  Summary  · With treatment and support, you can live with ADHD and manage your symptoms.  · The medicines that are most often prescribed for ADHD are stimulants.  · Consider taking part in family therapy or self-help groups with family members or friends.  · When you talk with friends and family about your ADHD, be patient and communicate openly.  · Take over-the-counter and prescription medicines only as told by your health care provider. Check with your health care provider before taking any new medicines.  This information is not intended to replace advice given to you by your health care provider. Make sure you discuss any questions you have with your health care provider.  Document Revised: 04/10/2020 Document Reviewed: 04/19/2018  [employer] Patient Education © 2020 [employer]    Generalized Anxiety Disorder, Adult  Generalized anxiety disorder (ENA) is a mental health disorder. People with this condition constantly worry about everyday events. Unlike normal anxiety, worry related to ENA is not triggered by a specific event. These worries also do not fade or get better with time. ENA interferes with life functions, including relationships, work, and school.  ENA can vary from mild to severe. People with severe ENA can have intense waves of anxiety with physical symptoms (panic attacks).  What are the causes?  The exact cause of ENA is not known.  What increases the risk?  This condition is more likely to develop in:  · Women.  · People who have a family history of anxiety disorders.  · People who are very shy.  · People who experience very stressful life events, such as the death of a loved one.  · People who have a very stressful family environment.  What are the signs or symptoms?  People with ENA often worry excessively about many things in their lives, such as their health and family. They may also be overly concerned about:  · Doing well at work.  · Being on time.  · Natural  disasters.  · Friendships.  Physical symptoms of ENA include:  · Fatigue.  · Muscle tension or having muscle twitches.  · Trembling or feeling shaky.  · Being easily startled.  · Feeling like your heart is pounding or racing.  · Feeling out of breath or like you cannot take a deep breath.  · Having trouble falling asleep or staying asleep.  · Sweating.  · Nausea, diarrhea, or irritable bowel syndrome (IBS).  · Headaches.  · Trouble concentrating or remembering facts.  · Restlessness.  · Irritability.  How is this diagnosed?  Your health care provider can diagnose ENA based on your symptoms and medical history. You will also have a physical exam. The health care provider will ask specific questions about your symptoms, including how severe they are, when they started, and if they come and go. Your health care provider may ask you about your use of alcohol or drugs, including prescription medicines. Your health care provider may refer you to a mental health specialist for further evaluation.  Your health care provider will do a thorough examination and may perform additional tests to rule out other possible causes of your symptoms.  To be diagnosed with ENA, a person must have anxiety that:  · Is out of his or her control.  · Affects several different aspects of his or her life, such as work and relationships.  · Causes distress that makes him or her unable to take part in normal activities.  · Includes at least three physical symptoms of ENA, such as restlessness, fatigue, trouble concentrating, irritability, muscle tension, or sleep problems.  Before your health care provider can confirm a diagnosis of ENA, these symptoms must be present more days than they are not, and they must last for six months or longer.  How is this treated?  The following therapies are usually used to treat ENA:  · Medicine. Antidepressant medicine is usually prescribed for long-term daily control. Antianxiety medicines may be added in  severe cases, especially when panic attacks occur.  · Talk therapy (psychotherapy). Certain types of talk therapy can be helpful in treating ENA by providing support, education, and guidance. Options include:  ? Cognitive behavioral therapy (CBT). People learn coping skills and techniques to ease their anxiety. They learn to identify unrealistic or negative thoughts and behaviors and to replace them with positive ones.  ? Acceptance and commitment therapy (ACT). This treatment teaches people how to be mindful as a way to cope with unwanted thoughts and feelings.  ? Biofeedback. This process trains you to manage your body's response (physiological response) through breathing techniques and relaxation methods. You will work with a therapist while machines are used to monitor your physical symptoms.  · Stress management techniques. These include yoga, meditation, and exercise.  A mental health specialist can help determine which treatment is best for you. Some people see improvement with one type of therapy. However, other people require a combination of therapies.  Follow these instructions at home:  · Take over-the-counter and prescription medicines only as told by your health care provider.  · Try to maintain a normal routine.  · Try to anticipate stressful situations and allow extra time to manage them.  · Practice any stress management or self-calming techniques as taught by your health care provider.  · Do not punish yourself for setbacks or for not making progress.  · Try to recognize your accomplishments, even if they are small.  · Keep all follow-up visits as told by your health care provider. This is important.  Contact a health care provider if:  · Your symptoms do not get better.  · Your symptoms get worse.  · You have signs of depression, such as:  ? A persistently sad, cranky, or irritable mood.  ? Loss of enjoyment in activities that used to bring you delma.  ? Change in weight or eating.  ? Changes in  sleeping habits.  ? Avoiding friends or family members.  ? Loss of energy for normal tasks.  ? Feelings of guilt or worthlessness.  Get help right away if:  · You have serious thoughts about hurting yourself or others.  If you ever feel like you may hurt yourself or others, or have thoughts about taking your own life, get help right away. You can go to your nearest emergency department or call:  · Your local emergency services (911 in the [place]  · A suicide crisis helpline, such as the National Suicide Prevention Lifeline at 1-216.455.4095. This is open 24 hours a day.  Summary  · Generalized anxiety disorder (ENA) is a mental health disorder that involves worry that is not triggered by a specific event.  · People with ENA often worry excessively about many things in their lives, such as their health and family.  · ENA may cause physical symptoms such as restlessness, trouble concentrating, sleep problems, frequent sweating, nausea, diarrhea, headaches, and trembling or muscle twitching.  · A mental health specialist can help determine which treatment is best for you. Some people see improvement with one type of therapy. However, other people require a combination of therapies.  This information is not intended to replace advice given to you by your health care provider. Make sure you discuss any questions you have with your health care provider.  Document Revised: 11/30/2018 Document Reviewed: 11/07/2017  Elsevier Patient Education © 2020 [employer]    Living With Depression  Everyone experiences occasional disappointment, sadness, and loss in their lives. When you are feeling down, blue, or sad for at least 2 weeks in a row, it may mean that you have depression. Depression can affect your thoughts and feelings, relationships, daily activities, and physical health. It is caused by changes in the way your brain functions. If you receive a diagnosis of depression, your health care provider will tell you which  type of depression you have and what treatment options are available to you.  If you are living with depression, there are ways to help you recover from it and also ways to prevent it from coming back.  How to cope with lifestyle changes  Coping with stress         Stress is your body’s reaction to life changes and events, both good and bad. Stressful situations may include:  · Getting .  · The death of a spouse.  · Losing a job.  · Retiring.  · Having a baby.  Stress can last just a few hours or it can be ongoing. Stress can play a major role in depression, so it is important to learn both how to cope with stress and how to think about it differently.  Talk with your health care provider or a counselor if you would like to learn more about stress reduction. He or she may suggest some stress reduction techniques, such as:  · Music therapy. This can include creating music or listening to music. Choose music that you enjoy and that inspires you.  · Mindfulness-based meditation. This kind of meditation can be done while sitting or walking. It involves being aware of your normal breaths, rather than trying to control your breathing.  · Centering prayer. This is a kind of meditation that involves focusing on a spiritual word or phrase. Choose a word, phrase, or sacred image that is meaningful to you and that brings you peace.  · Deep breathing. To do this, expand your stomach and inhale slowly through your nose. Hold your breath for 3-5 seconds, then exhale slowly, allowing your stomach muscles to relax.  · Muscle relaxation. This involves intentionally tensing muscles then relaxing them.  Choose a stress reduction technique that fits your lifestyle and personality. Stress reduction techniques take time and practice to develop. Set aside 5-15 minutes a day to do them. Therapists can offer training in these techniques. The training may be covered by some insurance plans. Other things you can do to manage stress  include:  · Keeping a stress diary. This can help you learn what triggers your stress and ways to control your response.  · Understanding what your limits are and saying no to requests or events that lead to a schedule that is too full.  · Thinking about how you respond to certain situations. You may not be able to control everything, but you can control how you react.  · Adding humor to your life by watching funny films or TV shows.  · Making time for activities that help you relax and not feeling guilty about spending your time this way.    Medicines  Your health care provider may suggest certain medicines if he or she feels that they will help improve your condition. Avoid using alcohol and other substances that may prevent your medicines from working properly (may interact). It is also important to:  · Talk with your pharmacist or health care provider about all the medicines that you take, their possible side effects, and what medicines are safe to take together.  · Make it your goal to take part in all treatment decisions (shared decision-making). This includes giving input on the side effects of medicines. It is best if shared decision-making with your health care provider is part of your total treatment plan.  If your health care provider prescribes a medicine, you may not notice the full benefits of it for 4-8 weeks. Most people who are treated for depression need to be on medicine for at least 6-12 months after they feel better. If you are taking medicines as part of your treatment, do not stop taking medicines without first talking to your health care provider. You may need to have the medicine slowly decreased (tapered) over time to decrease the risk of harmful side effects.  Relationships  Your health care provider may suggest family therapy along with individual therapy and drug therapy. While there may not be family problems that are causing you to feel depressed, it is still important to make sure  your family learns as much as they can about your mental health. Having your family’s support can help make your treatment successful.  How to recognize changes in your condition  Everyone has a different response to treatment for depression. Recovery from major depression happens when you have not had signs of major depression for two months. This may mean that you will start to:  · Have more interest in doing activities.  · Feel less hopeless than you did 2 months ago.  · Have more energy.  · Overeat less often, or have better or improving appetite.  · Have better concentration.  Your health care provider will work with you to decide the next steps in your recovery. It is also important to recognize when your condition is getting worse. Watch for these signs:  · Having fatigue or low energy.  · Eating too much or too little.  · Sleeping too much or too little.  · Feeling restless, agitated, or hopeless.  · Having trouble concentrating or making decisions.  · Having unexplained physical complaints.  · Feeling irritable, angry, or aggressive.  Get help as soon as you or your family members notice these symptoms coming back.  How to get support and help from others  How to talk with friends and family members about your condition    Talking to friends and family members about your condition can provide you with one way to get support and guidance. Reach out to trusted friends or family members, explain your symptoms to them, and let them know that you are working with a health care provider to treat your depression.  Financial resources  Not all insurance plans cover mental health care, so it is important to check with your insurance carrier. If paying for co-pays or counseling services is a problem, search for a local or [facility] mental health care center. They may be able to offer public mental health care services at low or no cost when you are not able to see a private health care provider.  If you are taking  medicine for depression, you may be able to get the generic form, which may be less expensive. Some makers of prescription medicines also offer help to patients who cannot afford the medicines they need.  Follow these instructions at home:    · Get the right amount and quality of sleep.  · Cut down on using caffeine, tobacco, alcohol, and other potentially harmful substances.  · Try to exercise, such as walking or lifting small weights.  · Take over-the-counter and prescription medicines only as told by your health care provider.  · Eat a healthy diet that includes plenty of vegetables, fruits, whole grains, low-fat dairy products, and lean protein. Do not eat a lot of foods that are high in solid fats, added sugars, or salt.  · Keep all follow-up visits as told by your health care provider. This is important.  Contact a health care provider if:  · You stop taking your antidepressant medicines, and you have any of these symptoms:  ? Nausea.  ? Headache.  ? Feeling lightheaded.  ? Chills and body aches.  ? Not being able to sleep (insomnia).  · You or your friends and family think your depression is getting worse.  Get help right away if:  · You have thoughts of hurting yourself or others.  If you ever feel like you may hurt yourself or others, or have thoughts about taking your own life, get help right away. You can go to your nearest emergency department or call:  · Your local emergency services (911 in the [place]  · A suicide crisis helpline, such as the National Suicide Prevention Lifeline at 1-658.750.6102. This is open 24-hours a day.  Summary  · If you are living with depression, there are ways to help you recover from it and also ways to prevent it from coming back.  · Work with your health care team to create a management plan that includes counseling, stress management techniques, and healthy lifestyle habits.  This information is not intended to replace advice given to you by your health care provider.  Make sure you discuss any questions you have with your health care provider.  Document Revised: 04/10/2020 Document Reviewed: 11/20/2017  Elsevier Patient Education © 2020 [employer]

## 2020-12-11 LAB — THYROPEROXIDASE AB SERPL-ACNC: 16 IU/ML (ref 0–34)

## 2021-01-19 ENCOUNTER — OFFICE VISIT (OUTPATIENT)
Dept: OBSTETRICS AND GYNECOLOGY | Facility: CLINIC | Age: 36
End: 2021-01-19

## 2021-01-19 VITALS
DIASTOLIC BLOOD PRESSURE: 78 MMHG | HEIGHT: 63 IN | SYSTOLIC BLOOD PRESSURE: 118 MMHG | RESPIRATION RATE: 14 BRPM | WEIGHT: 273.8 LBS | BODY MASS INDEX: 48.51 KG/M2

## 2021-01-19 DIAGNOSIS — Z01.419 WELL WOMAN EXAM WITH ROUTINE GYNECOLOGICAL EXAM: Primary | ICD-10-CM

## 2021-01-19 DIAGNOSIS — Z11.3 SCREENING FOR STDS (SEXUALLY TRANSMITTED DISEASES): ICD-10-CM

## 2021-01-19 DIAGNOSIS — N39.3 STRESS INCONTINENCE IN FEMALE: ICD-10-CM

## 2021-01-19 PROCEDURE — 99395 PREV VISIT EST AGE 18-39: CPT | Performed by: OBSTETRICS & GYNECOLOGY

## 2021-01-19 NOTE — PROGRESS NOTES
Subjective   Chief Complaint   Patient presents with   • Gynecologic Exam     Patient is here today for her annual and pap smear. Patient complains of leaking urine when she cough, sneeze, laugh, bend and exercise, ongoing for a couple of years and it's starting to impact her life.     Kya Dempsey is a 35 y.o. year old  presenting to be seen for her annual exam.  Patient is complaining of urinary incontinence.  She is been doing Kegel exercises and reports that the incontinence is probably getting worse.  She is nulliparous.  She is using a tubal for birth control.  Her cycles are regular without problems.  Conservative management of stress urinary incontinence was discussed.  Patient will decrease caffeine and increase the number of Kegel exercises.  She will return if needed.  Adult Visits - 19 to 39 Years - Counseling/Anticipatory Guidance: Nutrition, family planning/contraception, physical activity, healthy weight, injury prevention, misuse of tobacco, alcohol and drugs, sexual behavior and STDs, dental health, mental health, immunizations, screenings For Women: Breast cancer and self breast exams    SEXUAL Hx:  She is currently sexually active.  In the past year there has not been new sexual partners.    Condoms ARE typically used.  She would like to be screened for STD's at today's exam.  Current birth control method: tubal ligation.  She is happy with her current method of contraception and does not want to discuss alternative methods of contraception.  MENSTRUAL Hx:  Patient's last menstrual period was 2021 (exact date).  In the past 6 months her cycles have been regular, predictable and occur monthly.  Her menstrual flow is normal.   Each month on average there are roughly 1 day(s) of very heavy flow.    Intermenstrual bleeding is absent.    Post-coital bleeding is absent.  Dysmenorrhea: is not affecting her activities of daily living  PMS: is not affecting her activities of daily  "living  Her cycles are not a source of concern for her that she wishes to discuss today.  HEALTH Hx:  She exercises regularly:yes.  She wears her seat belt:yes.  She has concerns about domestic violence: no.  OTHER COMPLAINTS:  Nothing else    The following portions of the patient's history were reviewed and updated as appropriate:problem list, current medications, allergies, past family history, past medical history, past social history and past surgical history.    Social History    Tobacco Use      Smoking status: Never Smoker      Smokeless tobacco: Never Used    Review of Systems  Review of Systems - History obtained from the patient  General ROS: negative  Psychological ROS: positive for - anxiety and depression  ENT ROS: positive for - nasal congestion  Allergy and Immunology ROS: positive for - seasonal allergies  Hematological and Lymphatic ROS: positive for - bruising  Endocrine ROS: negative  Breast ROS: negative for breast lumps  Respiratory ROS: no cough, shortness of breath, or wheezing  Cardiovascular ROS: no chest pain or dyspnea on exertion  Gastrointestinal ROS: positive for - diarrhea and nausea/vomiting  Genito-Urinary ROS: positive for - incontinence  Musculoskeletal ROS: positive for - joint pain  Neurological ROS: no TIA or stroke symptoms  Dermatological ROS: negative        Objective   /78 (BP Location: Right arm, Patient Position: Sitting, Cuff Size: Large Adult)   Resp 14   Ht 160 cm (63\")   Wt 124 kg (273 lb 12.8 oz)   LMP 01/07/2021 (Exact Date)   Breastfeeding No   BMI 48.50 kg/m²     General:  well developed; well nourished  no acute distress   Skin:  No suspicious lesions seen   Thyroid: not examined   Breasts:  Examined in supine position  Symmetric without masses or skin dimpling  Nipples normal without inversion, lesions or discharge  There are no palpable axillary nodes   Abdomen: soft, non-tender; no masses  no umbilical or inguinal hernias are present  no " hepato-splenomegaly   Heart: regular rate and rhythm, S1, S2 normal, no murmur, click, rub or gallop   Lungs: clear to auscultation   Pelvis: Clinical staff was present for exam  External genitalia:  normal appearance of the external genitalia including Bartholin's and Anselmo's glands.  :  urethral meatus normal;  Vaginal:  normal pink mucosa without prolapse or lesions.  Cervix:  normal appearance.  Uterus:  normal size, shape and consistency.  Adnexa:  normal bimanual exam of the adnexa.  Rectal:  digital rectal exam not performed; anus visually normal appearing.          Assessment/Plan   Diagnoses and all orders for this visit:    1. Well woman exam with routine gynecological exam (Primary)  -     Pap IG, HPV-hr; Future  -     Pap IG, HPV-hr    2. Screening for STDs (sexually transmitted diseases)  -     Chlamydia trachomatis, Neisseria gonorrhoeae, Trichomonas vaginalis, PCR - Swab, Vagina; Future  -     Chlamydia trachomatis, Neisseria gonorrhoeae, Trichomonas vaginalis, PCR - Swab, Vagina    3. Stress incontinence in female         Communicate the results of STD screen and Pap smear  Return 1 year  This note was electronically signed.    Hardy Toney MD   January 19, 2021

## 2021-01-26 ENCOUNTER — TELEPHONE (OUTPATIENT)
Dept: OBSTETRICS AND GYNECOLOGY | Facility: CLINIC | Age: 36
End: 2021-01-26

## 2021-01-26 NOTE — TELEPHONE ENCOUNTER
PATIENT CALLED STATING SHE IS RETURNING CALL BACK FROM DR DERAS FROM LAST WEEK. PLEASE CALL.    Patient was called and informed that her STD cultures were negative.  Her Pap smear was read as ASCUS with negative HPV.  She was told to repeat the Pap smear within 1 year.    Hardy Deras MD

## 2021-04-23 ENCOUNTER — OFFICE VISIT (OUTPATIENT)
Dept: OBSTETRICS AND GYNECOLOGY | Facility: CLINIC | Age: 36
End: 2021-04-23

## 2021-04-23 VITALS
BODY MASS INDEX: 48.3 KG/M2 | SYSTOLIC BLOOD PRESSURE: 108 MMHG | HEIGHT: 63 IN | RESPIRATION RATE: 14 BRPM | DIASTOLIC BLOOD PRESSURE: 70 MMHG | WEIGHT: 272.6 LBS

## 2021-04-23 DIAGNOSIS — N90.7 SEBACEOUS CYST OF LABIA: Primary | ICD-10-CM

## 2021-04-23 PROCEDURE — 99212 OFFICE O/P EST SF 10 MIN: CPT | Performed by: OBSTETRICS & GYNECOLOGY

## 2021-04-23 RX ORDER — CLOBETASOL PROPIONATE 0.46 MG/ML
SOLUTION TOPICAL
COMMUNITY
Start: 2021-01-25 | End: 2022-12-30

## 2021-04-23 RX ORDER — TRIAMCINOLONE ACETONIDE 1 MG/G
CREAM TOPICAL
COMMUNITY
Start: 2021-01-25

## 2021-04-23 RX ORDER — FLUTICASONE PROPIONATE 0.05 %
CREAM (GRAM) TOPICAL
COMMUNITY
Start: 2021-01-25

## 2021-04-23 RX ORDER — CLOBETASOL PROPIONATE 0.05 G/ML
SPRAY TOPICAL
COMMUNITY
Start: 2021-02-16 | End: 2022-12-30

## 2021-04-23 NOTE — PROGRESS NOTES
Subjective   Kya Dempsey is a 35 y.o. female is here today as a self referral.    Chief Complaint   Patient presents with   • Follow-up     C/O painful blisters on her left labia and there's also one on her right labia as well but not as painful.         History of Present Illness  Patient has described having painful blisters that developed on her vulva about 3 to 4 weeks ago.  The initial one is now quite but is still there 1 or 2 others have come and gone since then.  Patient presents for diagnosis and treatment  The following portions of the patient's history were reviewed and updated as appropriate: allergies, current medications, past family history, past medical history, past social history, past surgical history and problem list.    Review of Systems - Negative except for present illness    Objective   General:  well developed; well nourished  no acute distress   Skin:  Not performed.   Thyroid: not examined   Breasts:  Not performed.   Abdomen: Not performed.   Heart: regular rate and rhythm, S1, S2 normal, no murmur, click, rub or gallop   Lungs: clear to auscultation   Pelvis: Clinical staff was present for exam  External genitalia:  normal appearance of the external genitalia including Bartholin's and [place]'s glands. Patient has small sebaceous cyst of the labia minora now present on her left.  The lesions on the right have resolved.  :  urethral meatus normal;  Vaginal:  normal pink mucosa without prolapse or lesions.  Cervix:  normal appearance.  Uterus:  normal size, shape and consistency.  Adnexa:  normal bimanual exam of the adnexa.  Rectal:  digital rectal exam not performed; anus visually normal appearing.         Assessment/Plan   Diagnoses and all orders for this visit:    1. Sebaceous cyst of labia (Primary)        Patient is describing sebaceous cyst lesions of the labia  She was reassured these were normal and will only need treatment if they persist or became painful.  Patient will  call if she is having problems..    Hardy Toney MD

## 2022-01-21 ENCOUNTER — OFFICE VISIT (OUTPATIENT)
Dept: OBSTETRICS AND GYNECOLOGY | Facility: CLINIC | Age: 37
End: 2022-01-21

## 2022-01-21 VITALS
BODY MASS INDEX: 49.95 KG/M2 | WEIGHT: 282 LBS | RESPIRATION RATE: 16 BRPM | DIASTOLIC BLOOD PRESSURE: 70 MMHG | SYSTOLIC BLOOD PRESSURE: 124 MMHG

## 2022-01-21 DIAGNOSIS — L72.3 SEBACEOUS CYST: ICD-10-CM

## 2022-01-21 DIAGNOSIS — Z01.419 ENCOUNTER FOR GYNECOLOGICAL EXAMINATION WITHOUT ABNORMAL FINDING: Primary | ICD-10-CM

## 2022-01-21 DIAGNOSIS — N39.3 SUI (STRESS URINARY INCONTINENCE, FEMALE): ICD-10-CM

## 2022-01-21 DIAGNOSIS — R87.610 ASCUS OF CERVIX WITH NEGATIVE HIGH RISK HPV: ICD-10-CM

## 2022-01-21 PROCEDURE — 2014F MENTAL STATUS ASSESS: CPT | Performed by: NURSE PRACTITIONER

## 2022-01-21 PROCEDURE — 99395 PREV VISIT EST AGE 18-39: CPT | Performed by: NURSE PRACTITIONER

## 2022-01-21 PROCEDURE — 3008F BODY MASS INDEX DOCD: CPT | Performed by: NURSE PRACTITIONER

## 2022-01-21 NOTE — PROGRESS NOTES
Annual Visit     Patient Name: Kya Dempsey  : 1985   MRN: [id number]   Care Team: Patient Care Team:  Amy Wright APRN as PCP - General (Nurse Practitioner)  Alysa Marie CNM as Midwife (Certified Nurse Midwife)    Chief Complaint:    Chief Complaint   Patient presents with   • Annual Exam       HPI: Kya Dempsey is a 36 y.o. year old  presenting to be seen for her gynecologic exam.   Pap smear  ASCUS HPV negative   Hx LEEP in  - mild dysplasia   Colposcopy in  with mild dysplasia     S/p BTL   Nulliparous     Monthly periods without c/o     C/o JOLIE - it is very bothersome and happens daily   She has been doing Kegels but hasn't noticed much improvement       Subjective      /70   Resp 16   Wt 128 kg (282 lb)   LMP 2021   Breastfeeding No   BMI 49.95 kg/m²     BMI reviewed: Body mass index is 49.95 kg/m².      Objective     Physical Exam    Neuro: alert and oriented to person, place and time   General:  alert; cooperative; well developed; well nourished   Skin:  No suspicious lesions seen   Thyroid: normal to inspection and palpation   Lungs:  breathing is unlabored  clear to auscultation bilaterally   Heart:  regular rate and rhythm, S1, S2 normal, no murmur, click, rub or gallop  normal apical impulse   Breasts:  Examined in supine position  Symmetric without masses or skin dimpling  Nipples normal without inversion, lesions or discharge  There are no palpable axillary nodes  Fibrocystic changes are present both breasts without a discrete mass   Abdomen: soft, non-tender; no masses  no umbilical or inguinal hernias are present  no hepato-splenomegaly   Pelvis: Clinical staff was present for exam  External genitalia:  normal appearance of the external genitalia including Bartholin's and Pinch's glands.  :  urethral meatus normal;  Vaginal:  normal pink mucosa without prolapse or lesions.  Cervix:  normal appearance.  Uterus:  not  palpable.  Adnexa:  non palpable bilaterally.  Rectal:  digital rectal exam not performed; anus visually normal appearing.     Bimanual exam limited d/t body habitus     Assessment / Plan      Assessment  Problems Addressed This Visit    ICD-10-CM ICD-9-CM   1. Encounter for gynecological examination without abnormal finding  Z01.419 V72.31   2. ASCUS of cervix with negative high risk HPV  R87.610 795.01   3. JOLIE (stress urinary incontinence, female)  N39.3 625.6   4. Sebaceous cyst  L72.3 706.2       Plan    Reviewed pap smear and LEEP results   Pap smear pending     Discussed interventions for JOLIE   Cont Kegels, avoidance of dietary irritants, and empty bladder frequently   Pelvic floor PT referral given     Sebaceous cyst not present on exam today   Discussed these can return   Recommend Dial soap, epsom salts baths and warm compresses   If becomes lg or bothersome may RTC for I&D     Discussed monthly SBEs and fibrocystic changes     AV 1 yr           Follow Up  Return in about 1 year (around 1/21/2023) for Annual physical.  Patient was given instructions and counseling regarding her condition or for health maintenance advice. Please see specific information pulled into the AVS if appropriate.     Yeni Ruiz, [place]  January 21, 2022  09:36 EST

## 2022-02-21 ENCOUNTER — TREATMENT (OUTPATIENT)
Dept: PHYSICAL THERAPY | Facility: CLINIC | Age: 37
End: 2022-02-21

## 2022-02-21 DIAGNOSIS — N39.3 SUI (STRESS URINARY INCONTINENCE, FEMALE): Primary | ICD-10-CM

## 2022-02-21 DIAGNOSIS — N81.89 PELVIC FLOOR WEAKNESS: ICD-10-CM

## 2022-02-21 DIAGNOSIS — M62.81 MUSCLE WEAKNESS: ICD-10-CM

## 2022-02-21 PROCEDURE — 97162 PT EVAL MOD COMPLEX 30 MIN: CPT | Performed by: PHYSICAL THERAPIST
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
## 2022-03-31 ENCOUNTER — TREATMENT (OUTPATIENT)
Dept: PHYSICAL THERAPY | Facility: CLINIC | Age: 37
End: 2022-03-31

## 2022-03-31 DIAGNOSIS — N39.3 SUI (STRESS URINARY INCONTINENCE, FEMALE): Primary | ICD-10-CM

## 2022-03-31 DIAGNOSIS — N81.89 PELVIC FLOOR WEAKNESS: ICD-10-CM

## 2022-03-31 DIAGNOSIS — M62.81 MUSCLE WEAKNESS: ICD-10-CM

## 2022-03-31 PROCEDURE — 97112 NEUROMUSCULAR REEDUCATION: CPT | Performed by: PHYSICAL THERAPIST

## 2022-03-31 PROCEDURE — 97110 THERAPEUTIC EXERCISES: CPT | Performed by: PHYSICAL THERAPIST

## 2022-04-07 ENCOUNTER — TREATMENT (OUTPATIENT)
Dept: PHYSICAL THERAPY | Facility: CLINIC | Age: 37
End: 2022-04-07

## 2022-04-07 DIAGNOSIS — N39.3 SUI (STRESS URINARY INCONTINENCE, FEMALE): Primary | ICD-10-CM

## 2022-04-07 DIAGNOSIS — N81.89 PELVIC FLOOR WEAKNESS: ICD-10-CM

## 2022-04-07 DIAGNOSIS — M62.81 MUSCLE WEAKNESS: ICD-10-CM

## 2022-04-07 PROCEDURE — 97112 NEUROMUSCULAR REEDUCATION: CPT | Performed by: PHYSICAL THERAPIST

## 2022-04-07 PROCEDURE — 97110 THERAPEUTIC EXERCISES: CPT | Performed by: PHYSICAL THERAPIST

## 2022-04-07 NOTE — PROGRESS NOTES
Physical Therapy Daily Progress Note  Patient: Kya Dempsey   : 1985  Diagnosis/ICD-10 Code:  JOLIE (stress urinary incontinence, female) [N39.3]  Referring practitioner: LOREN Adame*  Date of Initial Visit: Type: THERAPY  Noted: 2022  Today's Date: 4/10/2022  Patient seen for 3 sessions      Subjective   Kya Dempsey reports doing HEP 5/7 days. Easier to incorporate into schedule with sitting. Noticing it feels a little better. A little less concern with bladder in daily life.     Pain Rating (0-10): 0      Objective   verbal consent obtained for internal pelvic exam/treatment with declined need for second person in room       SEM.1 rest, avg 7.8, max 15.6    Sitting: avg 5.5, max 11.8  See Exercise, Manual, and Modality Logs for complete treatment.       Assessment/Plan  Pt with improved compliance with HEP. This was progressed to include resistance in standing.Will continue to progress strengthening as indicated to decrease incontinence.     Progress per Plan of Care         1100/1145   Timed:         Manual Therapy:    0     mins  59607;     Therapeutic Exercise:    10     mins  18124;     Neuromuscular Aayush:    35    mins  90818;    Therapeutic Activity:     0     mins  24485;     Gait Trainin     mins  90586;     Ultrasound:     0     mins  70007;    Ionto                               0    mins   52027  Self Care                       00     mins   94924  Canalith Repos               0    mins  82971    Un-Timed:  Electrical Stimulation:    0     mins  24987 ( );  Dry Needling     0     mins self-pay  Traction     0     mins 74854  Low Eval     0     Mins  60273  Mod Eval     0     Mins  24077  High Eval                       0     Mins  77700  Re-Eval                           0    mins  69208    Timed Treatment:   45   mins   Total Treatment:     45   mins    MERI Matute License # [id number]  Physical Therapist

## 2022-04-28 ENCOUNTER — TREATMENT (OUTPATIENT)
Dept: PHYSICAL THERAPY | Facility: CLINIC | Age: 37
End: 2022-04-28

## 2022-04-28 DIAGNOSIS — N39.3 SUI (STRESS URINARY INCONTINENCE, FEMALE): Primary | ICD-10-CM

## 2022-04-28 DIAGNOSIS — M62.81 MUSCLE WEAKNESS: ICD-10-CM

## 2022-04-28 DIAGNOSIS — N81.89 PELVIC FLOOR WEAKNESS: ICD-10-CM

## 2022-04-28 PROCEDURE — 97110 THERAPEUTIC EXERCISES: CPT | Performed by: PHYSICAL THERAPIST

## 2022-04-28 PROCEDURE — 97112 NEUROMUSCULAR REEDUCATION: CPT | Performed by: PHYSICAL THERAPIST

## 2022-04-28 NOTE — PROGRESS NOTES
Physical Therapy Daily Progress Note  Patient: Kya Dempsey   : 1985  Diagnosis/ICD-10 Code:  JOLIE (stress urinary incontinence, female) [N39.3]  Referring practitioner: LOREN Adame*  Date of Initial Visit: Type: THERAPY  Noted: 2022  Today's Date: 2022  Patient seen for 4 sessions      Subjective   Kya Dempsey reports doing pretty good. Doing exercises and have gotten pretty easy. Doing 5 days per week. Marching ones are what she does the least. Not noticing a lot of difference with bladder but feels like able to isolate mm better and has sometimes had some improvement with being able to hold/control. Mostly using 2 pads per day; coughing with allergies is biggest issue.   Pain Rating (0-10): 0      Objective   verbal consent obtained for external pelvic exam/treatment with declined need for second person in room     SEM.1 rest, avg 7.8, max 15.6     Sitting: avg 5.5, max 11.8    See Exercise, Manual, and Modality Logs for complete treatment.       Assessment/Plan  Pt is compliant with HEP. She demonstrates increasing recruitment of PFM today with SEMG. HEP advanced to include functional bracing with coughing and to incorporate functional movement in standing. Will continue to progress strengthening as indicated to reduce bladder symptoms.     Progress per Plan of Care         08845   Timed:         Manual Therapy:    0     mins  91178;     Therapeutic Exercise:    23     mins  80913;     Neuromuscular Aayush:    17    mins  35725;    Therapeutic Activity:     0     mins  52344;     Gait Trainin     mins  83109;     Ultrasound:     0     mins  12753;    Ionto                               0    mins   57746  Self Care                       0     mins   70713  Canalith Repos               0    mins  66058    Un-Timed:  Electrical Stimulation:    0     mins  15445 ( );  Dry Needling     0     mins self-pay  Traction     0     mins 33087  Low Eval     0      Mins  70771  Mod Eval     0     Mins  97078  High Eval                       0     Mins  45545  Re-Eval                           0    mins  76720    Timed Treatment:   40   mins   Total Treatment:     40   mins    Alfred Agarwal PT  [place] License # [id number]  Physical Therapist

## 2022-05-12 ENCOUNTER — TREATMENT (OUTPATIENT)
Dept: PHYSICAL THERAPY | Facility: CLINIC | Age: 37
End: 2022-05-12

## 2022-05-12 PROCEDURE — 97112 NEUROMUSCULAR REEDUCATION: CPT | Performed by: PHYSICAL THERAPIST

## 2022-05-12 PROCEDURE — 97110 THERAPEUTIC EXERCISES: CPT | Performed by: PHYSICAL THERAPIST

## 2022-05-12 NOTE — PROGRESS NOTES
Re-Assessment / Re-Certification        Patient: Kya Dempsey   : 1985  Diagnosis/ICD-10 Code:  No primary diagnosis found.  Referring practitioner: LOREN Adame*  Date of Initial Visit: No linked episodes  Today's Date: 2022  Patient seen for Visit count could not be calculated. Make sure you are using a visit which is associated with an episode. sessions      Subjective:   Kya Dempsey reports: using 2 pads per day can be barely damp unless surprise sneeze with allergies. Days that she works voiding every 2-3 hours. Every hour when at home and not working and not busy. Does some HEP daily but full set 5 days per week.     Subjective Questionnaire: MICHAEL 6: 37.5  Clinical Progress: improved  Home Program Compliance: Yes  Treatment has included: therapeutic exercise, neuromuscular re-education and therapeutic activity      Objective   verbal consent obtained for external pelvic exam/treatment with declined need for second person in room     SEM.1 rest, avg 8.6, max 15.2     Sitting: avg 6.6, max 9.9    See flowsheet for details of treatment following reassessment.     Assessment/Plan  Progress to physical therapy goals is fair.She reports improved feeling of PFM activation. She has met 2/4 short term goals and 0/5 long term goals. Improved MICHAEL-6 score from 54.2 to 37.5. HEP advanced to target fast twitch fibers today for decreasing incontinence with sudden sneeze/cough.  She will benefit from continued skilled physical therapy to address remaining impairments and functional limitations.   Progress toward previous goals: Partially Met    STG's: 4 weeks     Patient will report > 25% improvement in urinary symptoms  · Patient will use no more than 1 pad per day <50% wet for progress toward LTG  · Pt will be independent with functional bracing to decrease incontinence - met  · Patient will be able to perform HEP with minimal verbal cues - met     LTG's: By discharge  · Patient will  report > 75% improvement in urinary symptoms   · Patient functional outcome measure test, PFDI-20 improved score by > 50%  · Patient will be able to eliminate pad usage for improved QOL  · Patient will report voiding frequency to once every 3-4 hours for improved function with work travel  · Patient will be independent with HEP        Recommendations: Continue as planned  Timeframe: 2 months  Prognosis to achieve goals: fair    PT Signature: Alfred Agarwal PT      Based upon review of the patient's progress and continued therapy plan, it is my medical opinion that Kya Dempsey should continue physical therapy treatment at [facility]  [address] [place] [id number] [phone].    Signature: __________________________________    PHYSICIAN: Yeni Ruiz APRN  NPI: [id number]                                        0807/0845  Manual Therapy:    0     mins  67786;  Therapeutic Exercise:    8     mins  71005;     Neuromuscular Aayush:    30    mins  41648;    Therapeutic Activity:     0     mins  81384;     Gait Trainin     mins  58575;     Ultrasound:     0     mins  48693;    Electrical Stimulation:    0     mins  50362 ( );  Dry Needling     0     mins self-pay    Timed Treatment:   38   mins   Total Treatment:     38   mins

## 2022-05-26 ENCOUNTER — TREATMENT (OUTPATIENT)
Dept: PHYSICAL THERAPY | Facility: CLINIC | Age: 37
End: 2022-05-26

## 2022-05-26 DIAGNOSIS — N81.89 PELVIC FLOOR WEAKNESS: ICD-10-CM

## 2022-05-26 DIAGNOSIS — M62.81 MUSCLE WEAKNESS: ICD-10-CM

## 2022-05-26 DIAGNOSIS — N39.3 SUI (STRESS URINARY INCONTINENCE, FEMALE): Primary | ICD-10-CM

## 2022-05-26 PROCEDURE — 97112 NEUROMUSCULAR REEDUCATION: CPT | Performed by: PHYSICAL THERAPIST

## 2022-05-26 PROCEDURE — 97110 THERAPEUTIC EXERCISES: CPT | Performed by: PHYSICAL THERAPIST

## 2022-05-26 NOTE — PROGRESS NOTES
Physical Therapy Daily Progress Note  Patient: Kya Dempsey   : 1985  Diagnosis/ICD-10 Code:  JOLIE (stress urinary incontinence, female) [N39.3]  Referring practitioner: LOREN Adame*  Date of Initial Visit: Type: THERAPY  Noted: 2022  Today's Date: 2022  Patient seen for 5 sessions      Subjective   Kya Dempsey reports she has not been doing well with getting exercises done over the last 2 weeks just due to stuff going on in her life. Has done some intermittently, but not the new exercises.   Pain Rating (0-10): 0      Objective   verbal consent obtained for external pelvic exam/treatment with declined need for second person in room     SEM.1 rest, avg 11.8, max 16.4     Sitting: avg 5.7, max 8.1        See Exercise, Manual, and Modality Logs for complete treatment.         Assessment/Plan  Pt with decreased compliance with HEP. Reviewed today with SEMG and Pt tolerated with mild fatigue. She is to continue current exercises to further decrease bladder symptoms.     Progress per Plan of Care         0809/0847   Timed:         Manual Therapy:    0     mins  74578;     Therapeutic Exercise:    8     mins  77017;     Neuromuscular Aayush:    30    mins  06017;    Therapeutic Activity:     0     mins  93902;     Gait Trainin     mins  92754;     Ultrasound:     0     mins  64877;    Ionto                               0    mins   30463  Self Care                       0     mins   16002  Canalith Repos               0    mins  58109    Un-Timed:  Electrical Stimulation:    0     mins  25482 (MC );  Dry Needling     0     mins self-pay  Traction     0     mins 57988  Low Eval     0     Mins  28066  Mod Eval     0     Mins  38777  High Eval                       0     Mins  01952  Re-Eval                           0    mins  77741    Timed Treatment:   38   mins   Total Treatment:     38   mins    Alfred Agarwal PT  [place] License # [id number]  Physical Therapist

## 2022-06-23 ENCOUNTER — TREATMENT (OUTPATIENT)
Dept: PHYSICAL THERAPY | Facility: CLINIC | Age: 37
End: 2022-06-23

## 2022-06-23 DIAGNOSIS — N39.3 SUI (STRESS URINARY INCONTINENCE, FEMALE): Primary | ICD-10-CM

## 2022-06-23 DIAGNOSIS — N81.89 PELVIC FLOOR WEAKNESS: ICD-10-CM

## 2022-06-23 DIAGNOSIS — M62.81 MUSCLE WEAKNESS: ICD-10-CM

## 2022-06-23 PROCEDURE — 97530 THERAPEUTIC ACTIVITIES: CPT | Performed by: PHYSICAL THERAPIST

## 2022-06-23 PROCEDURE — 97112 NEUROMUSCULAR REEDUCATION: CPT | Performed by: PHYSICAL THERAPIST

## 2022-06-23 NOTE — PROGRESS NOTES
Re-Assessment / Re-Certification        Patient: Kya Dempsey   : 1985  Diagnosis/ICD-10 Code:  JOLIE (stress urinary incontinence, female) [N39.3]  Referring practitioner: LOREN Adame*  Date of Initial Visit: Type: THERAPY  Noted: 2022  Today's Date: 2022  Patient seen for 6 sessions      Subjective:   Kya Dempsey reports: doing HEP more often. Quick Flicks are still suprisingly challenging and has to concentrate to not engage other muscles. A couple of big sneezes that were a gush but overall doing much better. Rates improvement 25-30% range. Part of it is feeling a little better to control things. 2 per day pads that are usually barely damp. Voiding every 4-5 hours at work at times. More often at home 2-3 hours.       Subjective Questionnaire: MICHAEL-6  Clinical Progress: improved  Home Program Compliance: Yes  Treatment has included: therapeutic exercise, neuromuscular re-education, manual therapy and therapeutic activity      Objective   verbal consent obtained for internal pelvic exam/treatment. Pt gave consent for Nahum Rosa Student PT to be present in room for and participate in exam/treatment.     Supine- Rest: 1.9; Avg.: 12.0; Max: 20.3  Standing- Rest: 4.6; Avg. 11.5 Max 16.3      Student PT Nahum Rosa provided treatment under my direct supervision.    See flowsheet for details of treatment following reassessment.     Assessment/Plan  Progress to physical therapy goals is good.She reports improved urinary symptoms and a decrease in incontinence. She has not been consistent with her HEP. Today she did well with sEMG for visual feedback to assist with full relaxation and maintenance of holds with strengthening. She required verbal cueing to assist with coordination of contractions and full relaxation. She also received education on using vaginal weight to aid in training pelvic floor mm endurance.  She has met 4/4 short term goals and 1/5 long term goals. She  will benefit from continued skilled physical therapy to address remaining impairments and functional limitations.   Progress toward previous goals: Partially Met    Goals:   STG's: 4 weeks     Patient will report > 25% improvement in urinary symptoms - met   · Patient will use no more than 1 pad per day <50% wet for progress toward LTG - sufficiently met 2 less than <20%  · Pt will be independent with functional bracing to decrease incontinence - met  · Patient will be able to perform HEP with minimal verbal cues - met     LTG's: By discharge  · Patient will report > 75% improvement in urinary symptoms   · Patient functional outcome measure test, PFDI-20 improved score by > 50%  · Patient will be able to eliminate pad usage for improved QOL  · Patient will report voiding frequency to once every 3-4 hours for improved function with work travel - met  · Patient will be independent with HEP        Recommendations: Continue as planned  Timeframe: 2 months  Prognosis to achieve goals: good    PT Signature: Alfred Agarwal, PT      Based upon review of the patient's progress and continued therapy plan, it is my medical opinion that Kya Dempsey should continue physical therapy treatment at [facility]  [address] [place] [id number] [phone].    Signature: __________________________________    PHYSICIAN: Yeni Ruiz APRN  NPI: [id number]                                        [id number]   Manual Therapy:    0     mins  65543;  Therapeutic Exercise:    0     mins  24687;     Neuromuscular Aayush:   35    mins  35751;    Therapeutic Activity:     8     mins  35731;     Gait Trainin     mins  75922;     Ultrasound:     0     mins  19937;    Electrical Stimulation:    0     mins  12686 ( );  Dry Needling     0     mins self-pay    Timed Treatment:   43   mins   Total Treatment:     43   mins

## 2022-07-21 ENCOUNTER — TREATMENT (OUTPATIENT)
Dept: PHYSICAL THERAPY | Facility: CLINIC | Age: 37
End: 2022-07-21

## 2022-07-21 DIAGNOSIS — N39.3 SUI (STRESS URINARY INCONTINENCE, FEMALE): Primary | ICD-10-CM

## 2022-07-21 DIAGNOSIS — N81.89 PELVIC FLOOR WEAKNESS: ICD-10-CM

## 2022-07-21 DIAGNOSIS — M62.81 MUSCLE WEAKNESS: ICD-10-CM

## 2022-07-21 PROCEDURE — 97112 NEUROMUSCULAR REEDUCATION: CPT | Performed by: PHYSICAL THERAPIST

## 2022-07-21 PROCEDURE — 97110 THERAPEUTIC EXERCISES: CPT | Performed by: PHYSICAL THERAPIST

## 2022-07-21 NOTE — PROGRESS NOTES
Physical Therapy Daily Treatment Note  Patient: Kya Dempsey   : 1985  Diagnosis/ICD-10 Code:  JOLIE (stress urinary incontinence, female) [N39.3]  Referring practitioner: LOREN Adame*  Date of Initial Visit: Type: THERAPY  Noted: 2022  Today's Date: 2022  Patient seen for 7 sessions      Subjective   Kya Dempesy reports not a huge amount of change since last visit. She has started exercising more and has had increased leaking with yoga. Especially planks and downward dog. She has seen improvement with leaking with sneezing and coughing. She continues to use vaginal weights but is using lighter weights for like 2-3 hours a day and stronger weight for 15 minutes.    Pain Rating (0-10): 0      Objective   verbal consent obtained for external pelvic exam/treatment. Pt gave consent for Radhika Leroy PT to be present in room for and participate in exam/treatment.    sEMG sup: Rest 0.8, Avg 6.7, Max 11.8  sEMG sitting: Rest 1.7, Avg. 6.8, Max 12.8  sEMG standing: Rest 5.3, Avg. 6.8, Max 12.8    See Exercise, Manual, and Modality Logs for complete treatment.     Student PT Nahum Rosa provided treatment under my direct supervision.      Assessment/Plan  Kya has had increased compliance with her HEP. She states that she is using vaginal weights regularly and reports some improvement of symptoms with with coughing or sneezing, however, she would like to start an exercise regimen that includes yoga. Today she did well using sEMG to visualize pelvic floor muscle contraction and relaxation while doing exercises in standing. Pt had some pelvic floor mm spasm with performing HEP today and was instructed in not using weights for long periods of time. She received information on modifying yoga poses and to incorporate pelvic floor and abdominal bracing and HEP advanced to include functional movement. She also received new instruction on progressing vaginal weight HEP. Will  progress as tolerated to decrease incontinence..    Progress per Plan of Care          Timed: 1044- 1132       Manual Therapy:    0     mins  72239;     Therapeutic Exercise:    10     mins  47822;     Neuromuscular Aayush:    38    mins  63917;    Therapeutic Activity:     0     mins  35569;     Gait Trainin     mins  12718;     Ultrasound:     00     mins  15344;    Ionto                               0    mins   50223  Self Care                       0     mins   70875  Canalith Repos               0    mins  30756    Un-Timed:  Electrical Stimulation:    0     mins  51492 ( );  Dry Needling     0     mins self-pay  Traction     0     mins 23501  Low Eval     0     Mins  99654  Mod Eval     0     Mins  80899  High Eval                       0     Mins  77639  Re-Eval                           0    mins  49912    Timed Treatment:   48   mins   Total Treatment:     48   mins    MERI Matute License # [id number]  Physical Therapist

## 2022-12-30 ENCOUNTER — OFFICE VISIT (OUTPATIENT)
Dept: FAMILY MEDICINE CLINIC | Facility: CLINIC | Age: 37
End: 2022-12-30
Payer: COMMERCIAL

## 2022-12-30 ENCOUNTER — LAB (OUTPATIENT)
Dept: LAB | Facility: HOSPITAL | Age: 37
End: 2022-12-30
Payer: COMMERCIAL

## 2022-12-30 VITALS
DIASTOLIC BLOOD PRESSURE: 64 MMHG | HEART RATE: 74 BPM | RESPIRATION RATE: 15 BRPM | TEMPERATURE: 98.2 F | OXYGEN SATURATION: 100 % | HEIGHT: 63 IN | BODY MASS INDEX: 50.04 KG/M2 | SYSTOLIC BLOOD PRESSURE: 102 MMHG | WEIGHT: 282.4 LBS

## 2022-12-30 DIAGNOSIS — Z00.00 PHYSICAL EXAM, ANNUAL: ICD-10-CM

## 2022-12-30 DIAGNOSIS — Z13.0 SCREENING FOR DEFICIENCY ANEMIA: ICD-10-CM

## 2022-12-30 DIAGNOSIS — Z13.220 SCREENING FOR CHOLESTEROL LEVEL: ICD-10-CM

## 2022-12-30 DIAGNOSIS — Z13.29 SCREENING FOR THYROID DISORDER: ICD-10-CM

## 2022-12-30 DIAGNOSIS — Z23 IMMUNIZATION DUE: ICD-10-CM

## 2022-12-30 DIAGNOSIS — L21.9 SEBORRHEIC DERMATITIS OF SCALP: ICD-10-CM

## 2022-12-30 DIAGNOSIS — R20.9 DISTURBANCE OF SKIN SENSATION: ICD-10-CM

## 2022-12-30 DIAGNOSIS — Z13.1 SCREENING FOR DIABETES MELLITUS: ICD-10-CM

## 2022-12-30 DIAGNOSIS — Z76.89 ENCOUNTER TO ESTABLISH CARE: Primary | ICD-10-CM

## 2022-12-30 DIAGNOSIS — R10.13 EPIGASTRIC PAIN: ICD-10-CM

## 2022-12-30 DIAGNOSIS — F41.9 ANXIETY: ICD-10-CM

## 2022-12-30 LAB
ALBUMIN SERPL-MCNC: 4.4 G/DL (ref 3.5–5.2)
ALBUMIN/GLOB SERPL: 1.7 G/DL
ALP SERPL-CCNC: 48 U/L (ref 39–117)
ALT SERPL W P-5'-P-CCNC: 41 U/L (ref 1–33)
ANION GAP SERPL CALCULATED.3IONS-SCNC: 10.8 MMOL/L (ref 5–15)
AST SERPL-CCNC: 31 U/L (ref 1–32)
BILIRUB SERPL-MCNC: 0.2 MG/DL (ref 0–1.2)
BUN SERPL-MCNC: 9 MG/DL (ref 6–20)
BUN/CREAT SERPL: 15.3 (ref 7–25)
CALCIUM SPEC-SCNC: 9 MG/DL (ref 8.6–10.5)
CHLORIDE SERPL-SCNC: 107 MMOL/L (ref 98–107)
CHOLEST SERPL-MCNC: 167 MG/DL (ref 0–200)
CO2 SERPL-SCNC: 21.2 MMOL/L (ref 22–29)
CREAT SERPL-MCNC: 0.59 MG/DL (ref 0.57–1)
CRP SERPL-MCNC: 0.34 MG/DL (ref 0–0.5)
DEPRECATED RDW RBC AUTO: 43.2 FL (ref 37–54)
EGFRCR SERPLBLD CKD-EPI 2021: 120.7 ML/MIN/1.73
ERYTHROCYTE [DISTWIDTH] IN BLOOD BY AUTOMATED COUNT: 12.5 % (ref 12.3–15.4)
FOLATE SERPL-MCNC: 16.5 NG/ML (ref 4.78–24.2)
GLOBULIN UR ELPH-MCNC: 2.6 GM/DL
GLUCOSE SERPL-MCNC: 95 MG/DL (ref 65–99)
HCT VFR BLD AUTO: 40.8 % (ref 34–46.6)
HDLC SERPL-MCNC: 41 MG/DL (ref 40–60)
HGB BLD-MCNC: 13.7 G/DL (ref 12–15.9)
LDLC SERPL CALC-MCNC: 111 MG/DL (ref 0–100)
LDLC/HDLC SERPL: 2.68 {RATIO}
MCH RBC QN AUTO: 31.5 PG (ref 26.6–33)
MCHC RBC AUTO-ENTMCNC: 33.6 G/DL (ref 31.5–35.7)
MCV RBC AUTO: 93.8 FL (ref 79–97)
PLATELET # BLD AUTO: 227 10*3/MM3 (ref 140–450)
PMV BLD AUTO: 11.1 FL (ref 6–12)
POTASSIUM SERPL-SCNC: 4.1 MMOL/L (ref 3.5–5.2)
PROT SERPL-MCNC: 7 G/DL (ref 6–8.5)
RBC # BLD AUTO: 4.35 10*6/MM3 (ref 3.77–5.28)
SODIUM SERPL-SCNC: 139 MMOL/L (ref 136–145)
TRIGL SERPL-MCNC: 80 MG/DL (ref 0–150)
TSH SERPL DL<=0.05 MIU/L-ACNC: 1.63 UIU/ML (ref 0.27–4.2)
VIT B12 BLD-MCNC: 672 PG/ML (ref 211–946)
VLDLC SERPL-MCNC: 15 MG/DL (ref 5–40)
WBC NRBC COR # BLD: 6.9 10*3/MM3 (ref 3.4–10.8)

## 2022-12-30 PROCEDURE — 1159F MED LIST DOCD IN RCRD: CPT | Performed by: PHYSICIAN ASSISTANT

## 2022-12-30 PROCEDURE — 99395 PREV VISIT EST AGE 18-39: CPT | Performed by: PHYSICIAN ASSISTANT

## 2022-12-30 PROCEDURE — 2014F MENTAL STATUS ASSESS: CPT | Performed by: PHYSICIAN ASSISTANT

## 2022-12-30 PROCEDURE — 86140 C-REACTIVE PROTEIN: CPT

## 2022-12-30 PROCEDURE — 80050 GENERAL HEALTH PANEL: CPT

## 2022-12-30 PROCEDURE — 86038 ANTINUCLEAR ANTIBODIES: CPT

## 2022-12-30 PROCEDURE — 1160F RVW MEDS BY RX/DR IN RCRD: CPT | Performed by: PHYSICIAN ASSISTANT

## 2022-12-30 PROCEDURE — 82607 VITAMIN B-12: CPT

## 2022-12-30 PROCEDURE — 90686 IIV4 VACC NO PRSV 0.5 ML IM: CPT | Performed by: PHYSICIAN ASSISTANT

## 2022-12-30 PROCEDURE — 90471 IMMUNIZATION ADMIN: CPT | Performed by: PHYSICIAN ASSISTANT

## 2022-12-30 PROCEDURE — 82746 ASSAY OF FOLIC ACID SERUM: CPT

## 2022-12-30 PROCEDURE — 80061 LIPID PANEL: CPT

## 2022-12-30 PROCEDURE — 3008F BODY MASS INDEX DOCD: CPT | Performed by: PHYSICIAN ASSISTANT

## 2022-12-30 RX ORDER — KETOCONAZOLE 20 MG/ML
SHAMPOO TOPICAL 2 TIMES WEEKLY
Qty: 120 ML | Refills: 0 | Status: SHIPPED | OUTPATIENT
Start: 2023-01-02

## 2022-12-30 RX ORDER — OMEPRAZOLE 20 MG/1
20 CAPSULE, DELAYED RELEASE ORAL
Qty: 30 CAPSULE | Refills: 1 | Status: SHIPPED | OUTPATIENT
Start: 2022-12-30 | End: 2023-02-02 | Stop reason: SDUPTHER
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
## 2023-01-03 LAB — ANA SER QL: NEGATIVE

## 2023-01-15 ENCOUNTER — PATIENT MESSAGE (OUTPATIENT)
Dept: FAMILY MEDICINE CLINIC | Facility: CLINIC | Age: 38
End: 2023-01-15
Payer: COMMERCIAL

## 2023-01-26 ENCOUNTER — HOSPITAL ENCOUNTER (OUTPATIENT)
Dept: NEUROLOGY | Facility: HOSPITAL | Age: 38
Discharge: HOME | End: 2023-01-26
Admitting: PHYSICIAN ASSISTANT
Payer: COMMERCIAL

## 2023-01-26 DIAGNOSIS — R20.9 DISTURBANCE OF SKIN SENSATION: ICD-10-CM

## 2023-01-26 PROCEDURE — 95886 MUSC TEST DONE W/N TEST COMP: CPT

## 2023-01-26 PROCEDURE — 95910 NRV CNDJ TEST 7-8 STUDIES: CPT

## 2023-01-27 ENCOUNTER — OFFICE VISIT (OUTPATIENT)
Dept: OBSTETRICS AND GYNECOLOGY | Facility: CLINIC | Age: 38
End: 2023-01-27
Payer: COMMERCIAL

## 2023-01-27 VITALS
DIASTOLIC BLOOD PRESSURE: 80 MMHG | RESPIRATION RATE: 16 BRPM | SYSTOLIC BLOOD PRESSURE: 128 MMHG | BODY MASS INDEX: 50.14 KG/M2 | WEIGHT: 283 LBS

## 2023-01-27 DIAGNOSIS — N39.3 SUI (STRESS URINARY INCONTINENCE, FEMALE): ICD-10-CM

## 2023-01-27 DIAGNOSIS — Z01.419 ENCOUNTER FOR GYNECOLOGICAL EXAMINATION WITHOUT ABNORMAL FINDING: Primary | ICD-10-CM

## 2023-01-27 PROCEDURE — 2014F MENTAL STATUS ASSESS: CPT | Performed by: NURSE PRACTITIONER

## 2023-01-27 PROCEDURE — 3008F BODY MASS INDEX DOCD: CPT | Performed by: NURSE PRACTITIONER

## 2023-01-27 PROCEDURE — 99395 PREV VISIT EST AGE 18-39: CPT | Performed by: NURSE PRACTITIONER

## 2023-01-27 RX ORDER — MULTIPLE VITAMINS W/ MINERALS TAB 9MG-400MCG
1 TAB ORAL DAILY
COMMUNITY

## 2023-01-27 NOTE — PROGRESS NOTES
Annual Visit     Patient Name: Kya Dempsey  : 1985   MRN: [id number]   Care Team: Patient Care Team:  Darcy Burgess PA-C as PCP - General (Physician Assistant)  Yeni Ruiz APRN as Nurse Practitioner (Nurse Practitioner)    Chief Complaint:    Chief Complaint   Patient presents with   • Annual Exam       HPI: Kya Dempsey is a 37 y.o. year old  presenting to be seen for her gynecologic exam.   Pap smear 2022 WNL and HPV negative   Pap smear  ASCUS HPV negative   Hx LEEP in  - mild dysplasia   Colposcopy in 2018 with mild dysplasia      S/p BTL   Nulliparous      Monthly periods without c/o   LMP 23     JOLIE - did pelvic floor PT last yr which was helpful   She wasn't able to complete therapy d/t scheduling and family health issues - her Father had brain surgery   She has been doing at home exercises but has noticed some regression since having therapy sessions     Sebaceous cyst noted on exam last yr - states she has had another since then but thinks both have resolved now     States she and her partner have an open relationship - doesn't feel that she needs screening today, but asking about STI screening options     She works in the [employer] dept for [employer]       Subjective      I have reviewed the patients family history, social history, past medical history, past surgical history and have updated it as appropriate.    /80   Resp 16   Wt 128 kg (283 lb)   LMP 2023   BMI 50.14 kg/m²     BMI reviewed: Body mass index is 50.14 kg/m².      Objective     Physical Exam    Neuro: alert and oriented to person, place and time   General:  alert; cooperative; well developed; well nourished   Skin:  No suspicious lesions seen   Thyroid: normal to inspection and palpation   Lungs:  breathing is unlabored  clear to auscultation bilaterally   Heart:  regular rate and rhythm, S1, S2 normal, no murmur, click, rub or gallop  normal  apical impulse   Breasts:  Examined in supine position  Symmetric without masses or skin dimpling  Nipples normal without inversion, lesions or discharge  There are no palpable axillary nodes  Fibrocystic changes are present both breasts without a discrete mass   Abdomen: soft, non-tender; no masses  no umbilical or inguinal hernias are present  no hepato-splenomegaly   Pelvis: Clinical staff was present for exam  External genitalia:  normal appearance of the external genitalia including Bartholin's and [place]'s glands.  :  urethral meatus normal;  Vaginal:  normal pink mucosa without prolapse or lesions.  Cervix:  normal appearance.  Uterus:  normal size, shape and consistency.  Adnexa:  normal bimanual exam of the adnexa.  Rectal:  digital rectal exam not performed; anus visually normal appearing.         Assessment / Plan      Assessment  Problems Addressed This Visit    ICD-10-CM ICD-9-CM   1. Encounter for gynecological examination without abnormal finding  Z01.419 V72.31   2. JOLIE (stress urinary incontinence, female)  N39.3 625.6       Plan    Reviewed pap smear results and screening guidelines   May return to routine screening now q 3 yrs      Reviewed interventions for JOLIE    Cont Kegels, avoidance of dietary irritants, and empty bladder frequently   If she decides she would like an order to complete pelvic floor PT before next yr, she will let me know      No sebaceous cysts present on exam today    Discussed these can return   Recommend Dial soap, epsom salts baths and warm compresses   If becomes lg or bothersome may RTC for I&D      Discussed monthly SBEs and fibrocystic changes     Discussed STI screening options - cx in the office for GC/chlamydia/trichomonas can be done, labs including HIV and RPR can be ordered   Discussed faithful condom use for STI prevention      AV 1 yr         19 to 39: Counseling/Anticipatory Guidance Discussed: sexual behavior and STDs and breast cancer and self breast  exams    Follow Up  Return in about 1 year (around 1/27/2024) for Annual physical.  Patient was given instructions and counseling regarding her condition or for health maintenance advice. Please see specific information pulled into the AVS if appropriate.     CHARLOTTE Adame  January 27, 2023  09:38 EST

## 2023-02-02 ENCOUNTER — OFFICE VISIT (OUTPATIENT)
Dept: FAMILY MEDICINE CLINIC | Facility: CLINIC | Age: 38
End: 2023-02-02
Payer: COMMERCIAL

## 2023-02-02 VITALS
SYSTOLIC BLOOD PRESSURE: 122 MMHG | BODY MASS INDEX: 50.14 KG/M2 | DIASTOLIC BLOOD PRESSURE: 86 MMHG | WEIGHT: 283 LBS | OXYGEN SATURATION: 100 % | HEART RATE: 97 BPM | HEIGHT: 63 IN | TEMPERATURE: 98.6 F

## 2023-02-02 DIAGNOSIS — R10.13 EPIGASTRIC PAIN: ICD-10-CM

## 2023-02-02 DIAGNOSIS — Z23 IMMUNIZATION DUE: ICD-10-CM

## 2023-02-02 DIAGNOSIS — M79.672 LEFT FOOT PAIN: ICD-10-CM

## 2023-02-02 DIAGNOSIS — K21.9 GASTROESOPHAGEAL REFLUX DISEASE, UNSPECIFIED WHETHER ESOPHAGITIS PRESENT: Primary | ICD-10-CM

## 2023-02-02 DIAGNOSIS — R20.9 SKIN SENSATION DISTURBANCE: ICD-10-CM

## 2023-02-02 PROCEDURE — 99214 OFFICE O/P EST MOD 30 MIN: CPT | Performed by: PHYSICIAN ASSISTANT

## 2023-02-02 PROCEDURE — 0124A COVID-19 (PFIZER) BIVALENT BOOSTER 12+YRS: CPT | Performed by: PHYSICIAN ASSISTANT

## 2023-02-02 PROCEDURE — 91312 COVID-19 (PFIZER) BIVALENT BOOSTER 12+YRS: CPT | Performed by: PHYSICIAN ASSISTANT

## 2023-02-02 RX ORDER — OMEPRAZOLE 20 MG/1
20 CAPSULE, DELAYED RELEASE ORAL
Qty: 90 CAPSULE | Refills: 1 | Status: SHIPPED | OUTPATIENT
Start: 2023-02-02

## 2023-02-02 NOTE — PATIENT INSTRUCTIONS
Treatment for fibromyalgia first line is Duloxetine (cymbalta) - usual dose is up to 60 mg twice a day.  I would start you on the 30 mg to start.    Lyrica and Gabapentin are the other options.  Both are controlled.    [facility]
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
## 2023-05-23 NOTE — TELEPHONE ENCOUNTER
Patient called about PT referral. She would prefer to go to [facility] if possible. The office number is [phone].   .

## 2023-08-17 ENCOUNTER — LAB (OUTPATIENT)
Dept: LAB | Facility: HOSPITAL | Age: 38
End: 2023-08-17
Payer: COMMERCIAL

## 2023-08-17 ENCOUNTER — OFFICE VISIT (OUTPATIENT)
Dept: FAMILY MEDICINE CLINIC | Facility: CLINIC | Age: 38
End: 2023-08-17
Payer: COMMERCIAL

## 2023-08-17 VITALS
OXYGEN SATURATION: 99 % | BODY MASS INDEX: 50.14 KG/M2 | HEIGHT: 63 IN | DIASTOLIC BLOOD PRESSURE: 86 MMHG | RESPIRATION RATE: 18 BRPM | SYSTOLIC BLOOD PRESSURE: 122 MMHG | HEART RATE: 74 BPM

## 2023-08-17 DIAGNOSIS — E78.00 ELEVATED LDL CHOLESTEROL LEVEL: ICD-10-CM

## 2023-08-17 DIAGNOSIS — R74.8 ABNORMAL LIVER ENZYMES: ICD-10-CM

## 2023-08-17 DIAGNOSIS — Z86.59 HISTORY OF EATING DISORDER: ICD-10-CM

## 2023-08-17 DIAGNOSIS — M79.604 PAIN IN BOTH LOWER EXTREMITIES: Primary | ICD-10-CM

## 2023-08-17 DIAGNOSIS — M79.605 PAIN IN BOTH LOWER EXTREMITIES: Primary | ICD-10-CM

## 2023-08-17 DIAGNOSIS — R73.02 IMPAIRED GLUCOSE TOLERANCE: Primary | ICD-10-CM

## 2023-08-17 LAB
ALBUMIN SERPL-MCNC: 4.6 G/DL (ref 3.5–5.2)
ALBUMIN/GLOB SERPL: 1.6 G/DL
ALP SERPL-CCNC: 50 U/L (ref 39–117)
ALT SERPL W P-5'-P-CCNC: 14 U/L (ref 1–33)
ANION GAP SERPL CALCULATED.3IONS-SCNC: 13.9 MMOL/L (ref 5–15)
AST SERPL-CCNC: 21 U/L (ref 1–32)
BILIRUB SERPL-MCNC: 0.3 MG/DL (ref 0–1.2)
BUN SERPL-MCNC: 9 MG/DL (ref 6–20)
BUN/CREAT SERPL: 14.3 (ref 7–25)
CALCIUM SPEC-SCNC: 9.2 MG/DL (ref 8.6–10.5)
CHLORIDE SERPL-SCNC: 105 MMOL/L (ref 98–107)
CHOLEST SERPL-MCNC: 199 MG/DL (ref 0–200)
CO2 SERPL-SCNC: 21.1 MMOL/L (ref 22–29)
CREAT SERPL-MCNC: 0.63 MG/DL (ref 0.57–1)
EGFRCR SERPLBLD CKD-EPI 2021: 118.1 ML/MIN/1.73
GLOBULIN UR ELPH-MCNC: 2.8 GM/DL
GLUCOSE SERPL-MCNC: 115 MG/DL (ref 65–99)
HDLC SERPL-MCNC: 50 MG/DL (ref 40–60)
LDLC SERPL CALC-MCNC: 135 MG/DL (ref 0–100)
LDLC/HDLC SERPL: 2.67 {RATIO}
POTASSIUM SERPL-SCNC: 4.2 MMOL/L (ref 3.5–5.2)
PROT SERPL-MCNC: 7.4 G/DL (ref 6–8.5)
SODIUM SERPL-SCNC: 140 MMOL/L (ref 136–145)
TRIGL SERPL-MCNC: 77 MG/DL (ref 0–150)
VLDLC SERPL-MCNC: 14 MG/DL (ref 5–40)

## 2023-08-17 PROCEDURE — 80061 LIPID PANEL: CPT

## 2023-08-17 PROCEDURE — 1159F MED LIST DOCD IN RCRD: CPT | Performed by: PHYSICIAN ASSISTANT

## 2023-08-17 PROCEDURE — 1160F RVW MEDS BY RX/DR IN RCRD: CPT | Performed by: PHYSICIAN ASSISTANT

## 2023-08-17 PROCEDURE — 80053 COMPREHEN METABOLIC PANEL: CPT

## 2023-08-17 PROCEDURE — 99214 OFFICE O/P EST MOD 30 MIN: CPT | Performed by: PHYSICIAN ASSISTANT
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
## 2024-02-12 ENCOUNTER — OFFICE VISIT (OUTPATIENT)
Dept: FAMILY MEDICINE CLINIC | Facility: CLINIC | Age: 39
End: 2024-02-12
Payer: COMMERCIAL

## 2024-02-12 ENCOUNTER — LAB (OUTPATIENT)
Dept: LAB | Facility: HOSPITAL | Age: 39
End: 2024-02-12
Payer: COMMERCIAL

## 2024-02-12 VITALS
DIASTOLIC BLOOD PRESSURE: 76 MMHG | SYSTOLIC BLOOD PRESSURE: 130 MMHG | BODY MASS INDEX: 46.37 KG/M2 | HEIGHT: 64 IN | HEART RATE: 70 BPM | WEIGHT: 271.6 LBS | RESPIRATION RATE: 14 BRPM | OXYGEN SATURATION: 99 %

## 2024-02-12 DIAGNOSIS — Z13.1 SCREENING FOR DIABETES MELLITUS: ICD-10-CM

## 2024-02-12 DIAGNOSIS — L21.9 SEBORRHEIC DERMATITIS OF SCALP: ICD-10-CM

## 2024-02-12 DIAGNOSIS — Z13.29 SCREENING FOR THYROID DISORDER: ICD-10-CM

## 2024-02-12 DIAGNOSIS — Z87.42 HISTORY OF ABNORMAL CERVICAL PAP SMEAR: ICD-10-CM

## 2024-02-12 DIAGNOSIS — Z13.220 SCREENING FOR CHOLESTEROL LEVEL: ICD-10-CM

## 2024-02-12 DIAGNOSIS — F41.9 ANXIETY: ICD-10-CM

## 2024-02-12 DIAGNOSIS — Z76.89 ENCOUNTER TO ESTABLISH CARE: ICD-10-CM

## 2024-02-12 DIAGNOSIS — Z00.00 PHYSICAL EXAM, ANNUAL: Primary | ICD-10-CM

## 2024-02-12 DIAGNOSIS — K21.9 GASTROESOPHAGEAL REFLUX DISEASE, UNSPECIFIED WHETHER ESOPHAGITIS PRESENT: ICD-10-CM

## 2024-02-12 DIAGNOSIS — R73.02 IMPAIRED GLUCOSE TOLERANCE: ICD-10-CM

## 2024-02-12 DIAGNOSIS — Z13.0 SCREENING FOR DEFICIENCY ANEMIA: ICD-10-CM

## 2024-02-12 DIAGNOSIS — M79.7 FIBROMYALGIA: ICD-10-CM

## 2024-02-12 DIAGNOSIS — F33.0 MILD EPISODE OF RECURRENT MAJOR DEPRESSIVE DISORDER: ICD-10-CM

## 2024-02-12 LAB
ALBUMIN SERPL-MCNC: 4.4 G/DL (ref 3.5–5.2)
ALBUMIN/GLOB SERPL: 1.4 G/DL
ALP SERPL-CCNC: 53 U/L (ref 39–117)
ALT SERPL W P-5'-P-CCNC: 13 U/L (ref 1–33)
ANION GAP SERPL CALCULATED.3IONS-SCNC: 12.9 MMOL/L (ref 5–15)
AST SERPL-CCNC: 17 U/L (ref 1–32)
BASOPHILS # BLD AUTO: 0.03 10*3/MM3 (ref 0–0.2)
BASOPHILS NFR BLD AUTO: 0.3 % (ref 0–1.5)
BILIRUB SERPL-MCNC: 0.3 MG/DL (ref 0–1.2)
BUN SERPL-MCNC: 10 MG/DL (ref 6–20)
BUN/CREAT SERPL: 15.6 (ref 7–25)
CALCIUM SPEC-SCNC: 9.6 MG/DL (ref 8.6–10.5)
CHLORIDE SERPL-SCNC: 104 MMOL/L (ref 98–107)
CHOLEST SERPL-MCNC: 205 MG/DL (ref 0–200)
CO2 SERPL-SCNC: 22.1 MMOL/L (ref 22–29)
CREAT SERPL-MCNC: 0.64 MG/DL (ref 0.57–1)
DEPRECATED RDW RBC AUTO: 42.2 FL (ref 37–54)
EGFRCR SERPLBLD CKD-EPI 2021: 117.6 ML/MIN/1.73
EOSINOPHIL # BLD AUTO: 0.15 10*3/MM3 (ref 0–0.4)
EOSINOPHIL NFR BLD AUTO: 1.7 % (ref 0.3–6.2)
ERYTHROCYTE [DISTWIDTH] IN BLOOD BY AUTOMATED COUNT: 12.3 % (ref 12.3–15.4)
GLOBULIN UR ELPH-MCNC: 3.1 GM/DL
GLUCOSE SERPL-MCNC: 90 MG/DL (ref 65–99)
HBA1C MFR BLD: 5.6 % (ref 4.8–5.6)
HCT VFR BLD AUTO: 42.7 % (ref 34–46.6)
HDLC SERPL-MCNC: 57 MG/DL (ref 40–60)
HGB BLD-MCNC: 14.6 G/DL (ref 12–15.9)
IMM GRANULOCYTES # BLD AUTO: 0.03 10*3/MM3 (ref 0–0.05)
IMM GRANULOCYTES NFR BLD AUTO: 0.3 % (ref 0–0.5)
LDLC SERPL CALC-MCNC: 135 MG/DL (ref 0–100)
LDLC/HDLC SERPL: 2.34 {RATIO}
LYMPHOCYTES # BLD AUTO: 2.17 10*3/MM3 (ref 0.7–3.1)
LYMPHOCYTES NFR BLD AUTO: 24.2 % (ref 19.6–45.3)
MCH RBC QN AUTO: 32.2 PG (ref 26.6–33)
MCHC RBC AUTO-ENTMCNC: 34.2 G/DL (ref 31.5–35.7)
MCV RBC AUTO: 94.3 FL (ref 79–97)
MONOCYTES # BLD AUTO: 0.8 10*3/MM3 (ref 0.1–0.9)
MONOCYTES NFR BLD AUTO: 8.9 % (ref 5–12)
NEUTROPHILS NFR BLD AUTO: 5.77 10*3/MM3 (ref 1.7–7)
NEUTROPHILS NFR BLD AUTO: 64.6 % (ref 42.7–76)
NRBC BLD AUTO-RTO: 0 /100 WBC (ref 0–0.2)
PLATELET # BLD AUTO: 210 10*3/MM3 (ref 140–450)
PMV BLD AUTO: 11.7 FL (ref 6–12)
POTASSIUM SERPL-SCNC: 3.9 MMOL/L (ref 3.5–5.2)
PROT SERPL-MCNC: 7.5 G/DL (ref 6–8.5)
RBC # BLD AUTO: 4.53 10*6/MM3 (ref 3.77–5.28)
SODIUM SERPL-SCNC: 139 MMOL/L (ref 136–145)
TRIGL SERPL-MCNC: 73 MG/DL (ref 0–150)
TSH SERPL DL<=0.05 MIU/L-ACNC: 4.5 UIU/ML (ref 0.27–4.2)
VLDLC SERPL-MCNC: 13 MG/DL (ref 5–40)
WBC NRBC COR # BLD AUTO: 8.95 10*3/MM3 (ref 3.4–10.8)

## 2024-02-12 PROCEDURE — 84439 ASSAY OF FREE THYROXINE: CPT | Performed by: PHYSICIAN ASSISTANT

## 2024-02-12 PROCEDURE — 99214 OFFICE O/P EST MOD 30 MIN: CPT | Performed by: PHYSICIAN ASSISTANT

## 2024-02-12 PROCEDURE — 80061 LIPID PANEL: CPT | Performed by: PHYSICIAN ASSISTANT

## 2024-02-12 PROCEDURE — 80050 GENERAL HEALTH PANEL: CPT | Performed by: PHYSICIAN ASSISTANT

## 2024-02-12 PROCEDURE — 99395 PREV VISIT EST AGE 18-39: CPT | Performed by: PHYSICIAN ASSISTANT

## 2024-02-12 PROCEDURE — 83036 HEMOGLOBIN GLYCOSYLATED A1C: CPT | Performed by: PHYSICIAN ASSISTANT

## 2024-02-12 RX ORDER — FAMOTIDINE 20 MG/1
20 TABLET, FILM COATED ORAL NIGHTLY PRN
Qty: 90 TABLET | Refills: 0 | Status: SHIPPED | OUTPATIENT
Start: 2024-02-12

## 2024-02-12 RX ORDER — DULOXETIN HYDROCHLORIDE 30 MG/1
30 CAPSULE, DELAYED RELEASE ORAL DAILY
Qty: 90 CAPSULE | Refills: 1 | Status: SHIPPED | OUTPATIENT
Start: 2024-02-12

## 2024-02-12 RX ORDER — KETOCONAZOLE 20 MG/ML
SHAMPOO TOPICAL 2 TIMES WEEKLY
Qty: 120 ML | Refills: 2 | Status: SHIPPED | OUTPATIENT
Start: 2024-02-12

## 2024-02-12 RX ORDER — KETOCONAZOLE 20 MG/ML
SHAMPOO TOPICAL 2 TIMES WEEKLY
Qty: 120 ML | Refills: 0 | Status: SHIPPED | OUTPATIENT
Start: 2024-02-12 | End: 2024-02-12 | Stop reason: SDUPTHER

## 2024-02-13 DIAGNOSIS — R79.89 ABNORMAL THYROID BLOOD TEST: Primary | ICD-10-CM

## 2024-02-13 LAB — T4 FREE SERPL-MCNC: 1.15 NG/DL (ref 0.93–1.7)

## 2024-03-14 ENCOUNTER — LAB (OUTPATIENT)
Dept: LAB | Facility: HOSPITAL | Age: 39
End: 2024-03-14
Payer: COMMERCIAL

## 2024-03-14 DIAGNOSIS — R79.89 ABNORMAL THYROID BLOOD TEST: ICD-10-CM

## 2024-03-14 LAB — TSH SERPL DL<=0.05 MIU/L-ACNC: 2.86 UIU/ML (ref 0.27–4.2)

## 2024-03-14 PROCEDURE — 84443 ASSAY THYROID STIM HORMONE: CPT

## 2024-05-11 DIAGNOSIS — K21.9 GASTROESOPHAGEAL REFLUX DISEASE, UNSPECIFIED WHETHER ESOPHAGITIS PRESENT: ICD-10-CM

## 2024-05-13 RX ORDER — FAMOTIDINE 20 MG/1
TABLET, FILM COATED ORAL
Qty: 90 TABLET | Refills: 0 | Status: SHIPPED | OUTPATIENT
Start: 2024-05-13

## 2024-06-03 ENCOUNTER — TELEPHONE (OUTPATIENT)
Dept: FAMILY MEDICINE CLINIC | Facility: CLINIC | Age: 39
End: 2024-06-03
Payer: COMMERCIAL

## 2024-06-03 NOTE — TELEPHONE ENCOUNTER
LVM FOR PT TO RETURN CALL PAPERWORK NEEDS TO BE SIGNED BY PT BEFORE WE CAN FAX. PAPERWORK AT [facility] DESK

## 2024-10-15 ENCOUNTER — OFFICE VISIT (OUTPATIENT)
Dept: OBSTETRICS AND GYNECOLOGY | Facility: CLINIC | Age: 39
End: 2024-10-15
Payer: COMMERCIAL

## 2024-10-15 VITALS
WEIGHT: 265 LBS | BODY MASS INDEX: 45.24 KG/M2 | HEIGHT: 64 IN | DIASTOLIC BLOOD PRESSURE: 70 MMHG | SYSTOLIC BLOOD PRESSURE: 120 MMHG

## 2024-10-15 DIAGNOSIS — Z98.890 HISTORY OF LOOP ELECTRICAL EXCISION PROCEDURE (LEEP): ICD-10-CM

## 2024-10-15 DIAGNOSIS — Z01.419 WOMEN'S ANNUAL ROUTINE GYNECOLOGICAL EXAMINATION: Primary | ICD-10-CM

## 2024-10-15 PROBLEM — Z78.9: Status: RESOLVED | Noted: 2017-08-30 | Resolved: 2024-10-15

## 2024-10-15 PROBLEM — R20.9 SKIN SENSATION DISTURBANCE: Status: RESOLVED | Noted: 2023-02-02 | Resolved: 2024-10-15
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
## 2024-10-17 LAB — REF LAB TEST METHOD: NORMAL

## 2025-08-13 ENCOUNTER — OFFICE VISIT (OUTPATIENT)
Dept: FAMILY MEDICINE CLINIC | Facility: CLINIC | Age: 40
End: 2025-08-13
Payer: COMMERCIAL

## 2025-08-13 VITALS
BODY MASS INDEX: 42.51 KG/M2 | SYSTOLIC BLOOD PRESSURE: 120 MMHG | HEIGHT: 64 IN | DIASTOLIC BLOOD PRESSURE: 74 MMHG | HEART RATE: 90 BPM | OXYGEN SATURATION: 98 % | TEMPERATURE: 97.9 F | WEIGHT: 249 LBS

## 2025-08-13 DIAGNOSIS — F41.8 DEPRESSION WITH ANXIETY: ICD-10-CM

## 2025-08-13 DIAGNOSIS — Z00.00 PHYSICAL EXAM, ANNUAL: Primary | ICD-10-CM

## 2025-08-13 DIAGNOSIS — R60.0 LIPEDEMA OF LOWER EXTREMITY: ICD-10-CM

## 2025-08-13 DIAGNOSIS — E66.813 CLASS 3 SEVERE OBESITY WITHOUT SERIOUS COMORBIDITY WITH BODY MASS INDEX (BMI) OF 40.0 TO 44.9 IN ADULT, UNSPECIFIED OBESITY TYPE: ICD-10-CM

## 2025-08-13 DIAGNOSIS — R03.0 ELEVATED BLOOD PRESSURE READING IN OFFICE WITHOUT DIAGNOSIS OF HYPERTENSION: ICD-10-CM

## 2025-08-13 DIAGNOSIS — M54.42 ACUTE MIDLINE LOW BACK PAIN WITH LEFT-SIDED SCIATICA: ICD-10-CM

## 2025-08-13 PROBLEM — E66.9 OBESITY, UNSPECIFIED: Status: ACTIVE | Noted: 2025-08-13

## 2025-08-13 RX ORDER — CYCLOBENZAPRINE HCL 5 MG
5 TABLET ORAL NIGHTLY PRN
Qty: 10 TABLET | Refills: 0 | Status: SHIPPED | OUTPATIENT
Start: 2025-08-13

## 2025-08-13 RX ORDER — AMOXICILLIN 500 MG
CAPSULE ORAL
COMMUNITY
Start: 2025-06-02

## 2025-08-13 RX ORDER — NAPROXEN SODIUM 550 MG/1
550 TABLET ORAL 2 TIMES DAILY WITH MEALS
Qty: 20 TABLET | Refills: 0 | Status: SHIPPED | OUTPATIENT
Start: 2025-08-13 | End: 2025-08-23

## 2025-08-28 ENCOUNTER — LAB (OUTPATIENT)
Dept: LAB | Facility: HOSPITAL | Age: 40
End: 2025-08-28
Payer: COMMERCIAL

## 2025-08-28 DIAGNOSIS — Z00.00 PHYSICAL EXAM, ANNUAL: ICD-10-CM

## 2025-08-28 LAB
ALBUMIN SERPL-MCNC: 4.3 G/DL (ref 3.5–5.2)
ALBUMIN/GLOB SERPL: 1.3 G/DL
ALP SERPL-CCNC: 53 U/L (ref 39–117)
ALT SERPL W P-5'-P-CCNC: 14 U/L (ref 1–33)
ANION GAP SERPL CALCULATED.3IONS-SCNC: 13.4 MMOL/L (ref 5–15)
AST SERPL-CCNC: 21 U/L (ref 1–32)
BASOPHILS # BLD AUTO: 0.05 10*3/MM3 (ref 0–0.2)
BASOPHILS NFR BLD AUTO: 0.6 % (ref 0–1.5)
BILIRUB SERPL-MCNC: 0.5 MG/DL (ref 0–1.2)
BUN SERPL-MCNC: 10 MG/DL (ref 6–20)
BUN/CREAT SERPL: 13.7 (ref 7–25)
CALCIUM SPEC-SCNC: 9.3 MG/DL (ref 8.6–10.5)
CHLORIDE SERPL-SCNC: 105 MMOL/L (ref 98–107)
CHOLEST SERPL-MCNC: 189 MG/DL (ref 0–200)
CO2 SERPL-SCNC: 21.6 MMOL/L (ref 22–29)
CREAT SERPL-MCNC: 0.73 MG/DL (ref 0.57–1)
DEPRECATED RDW RBC AUTO: 44.9 FL (ref 37–54)
EGFRCR SERPLBLD CKD-EPI 2021: 108.1 ML/MIN/1.73
EOSINOPHIL # BLD AUTO: 0.2 10*3/MM3 (ref 0–0.4)
EOSINOPHIL NFR BLD AUTO: 2.2 % (ref 0.3–6.2)
ERYTHROCYTE [DISTWIDTH] IN BLOOD BY AUTOMATED COUNT: 12.6 % (ref 12.3–15.4)
GLOBULIN UR ELPH-MCNC: 3.3 GM/DL
GLUCOSE SERPL-MCNC: 103 MG/DL (ref 65–99)
HBA1C MFR BLD: 5.3 % (ref 4.8–5.6)
HCT VFR BLD AUTO: 44.1 % (ref 34–46.6)
HDLC SERPL-MCNC: 48 MG/DL (ref 40–60)
HGB BLD-MCNC: 15 G/DL (ref 12–15.9)
IMM GRANULOCYTES # BLD AUTO: 0.03 10*3/MM3 (ref 0–0.05)
IMM GRANULOCYTES NFR BLD AUTO: 0.3 % (ref 0–0.5)
LDLC SERPL CALC-MCNC: 128 MG/DL (ref 0–100)
LDLC/HDLC SERPL: 2.64 {RATIO}
LYMPHOCYTES # BLD AUTO: 2.18 10*3/MM3 (ref 0.7–3.1)
LYMPHOCYTES NFR BLD AUTO: 24.3 % (ref 19.6–45.3)
MCH RBC QN AUTO: 33.5 PG (ref 26.6–33)
MCHC RBC AUTO-ENTMCNC: 34 G/DL (ref 31.5–35.7)
MCV RBC AUTO: 98.4 FL (ref 79–97)
MONOCYTES # BLD AUTO: 0.84 10*3/MM3 (ref 0.1–0.9)
MONOCYTES NFR BLD AUTO: 9.4 % (ref 5–12)
NEUTROPHILS NFR BLD AUTO: 5.66 10*3/MM3 (ref 1.7–7)
NEUTROPHILS NFR BLD AUTO: 63.2 % (ref 42.7–76)
NRBC BLD AUTO-RTO: 0 /100 WBC (ref 0–0.2)
PLATELET # BLD AUTO: 121 10*3/MM3 (ref 140–450)
PMV BLD AUTO: 12.3 FL (ref 6–12)
POTASSIUM SERPL-SCNC: 4.1 MMOL/L (ref 3.5–5.2)
PROT SERPL-MCNC: 7.6 G/DL (ref 6–8.5)
RBC # BLD AUTO: 4.48 10*6/MM3 (ref 3.77–5.28)
SODIUM SERPL-SCNC: 140 MMOL/L (ref 136–145)
TRIGL SERPL-MCNC: 72 MG/DL (ref 0–150)
TSH SERPL DL<=0.05 MIU/L-ACNC: 3.27 UIU/ML (ref 0.27–4.2)
VLDLC SERPL-MCNC: 13 MG/DL (ref 5–40)
WBC NRBC COR # BLD AUTO: 8.96 10*3/MM3 (ref 3.4–10.8)

## 2025-08-28 PROCEDURE — 80061 LIPID PANEL: CPT

## 2025-08-28 PROCEDURE — 80050 GENERAL HEALTH PANEL: CPT

## 2025-08-28 PROCEDURE — 83036 HEMOGLOBIN GLYCOSYLATED A1C: CPT
